# Patient Record
Sex: FEMALE | Race: WHITE | NOT HISPANIC OR LATINO
[De-identification: names, ages, dates, MRNs, and addresses within clinical notes are randomized per-mention and may not be internally consistent; named-entity substitution may affect disease eponyms.]

---

## 2017-12-05 ENCOUNTER — APPOINTMENT (OUTPATIENT)
Dept: FAMILY MEDICINE | Facility: CLINIC | Age: 62
End: 2017-12-05
Payer: COMMERCIAL

## 2017-12-05 ENCOUNTER — NON-APPOINTMENT (OUTPATIENT)
Age: 62
End: 2017-12-05

## 2017-12-05 VITALS
BODY MASS INDEX: 23.19 KG/M2 | SYSTOLIC BLOOD PRESSURE: 130 MMHG | DIASTOLIC BLOOD PRESSURE: 80 MMHG | RESPIRATION RATE: 12 BRPM | HEART RATE: 65 BPM | WEIGHT: 126 LBS | HEIGHT: 62 IN | OXYGEN SATURATION: 98 %

## 2017-12-05 DIAGNOSIS — Z82.5 FAMILY HISTORY OF ASTHMA AND OTHER CHRONIC LOWER RESPIRATORY DISEASES: ICD-10-CM

## 2017-12-05 DIAGNOSIS — Z78.9 OTHER SPECIFIED HEALTH STATUS: ICD-10-CM

## 2017-12-05 DIAGNOSIS — Z87.39 PERSONAL HISTORY OF OTHER DISEASES OF THE MUSCULOSKELETAL SYSTEM AND CONNECTIVE TISSUE: ICD-10-CM

## 2017-12-05 DIAGNOSIS — R07.9 CHEST PAIN, UNSPECIFIED: ICD-10-CM

## 2017-12-05 DIAGNOSIS — Z87.898 PERSONAL HISTORY OF OTHER SPECIFIED CONDITIONS: ICD-10-CM

## 2017-12-05 DIAGNOSIS — Z82.49 FAMILY HISTORY OF ISCHEMIC HEART DISEASE AND OTHER DISEASES OF THE CIRCULATORY SYSTEM: ICD-10-CM

## 2017-12-05 PROCEDURE — 93000 ELECTROCARDIOGRAM COMPLETE: CPT

## 2017-12-05 PROCEDURE — 99213 OFFICE O/P EST LOW 20 MIN: CPT | Mod: 25

## 2018-02-13 ENCOUNTER — APPOINTMENT (OUTPATIENT)
Dept: FAMILY MEDICINE | Facility: CLINIC | Age: 63
End: 2018-02-13
Payer: COMMERCIAL

## 2018-02-13 VITALS
HEART RATE: 61 BPM | BODY MASS INDEX: 22.66 KG/M2 | TEMPERATURE: 98 F | OXYGEN SATURATION: 98 % | DIASTOLIC BLOOD PRESSURE: 76 MMHG | HEIGHT: 61 IN | SYSTOLIC BLOOD PRESSURE: 108 MMHG | RESPIRATION RATE: 14 BRPM | WEIGHT: 120 LBS

## 2018-02-13 LAB
BILIRUB UR QL STRIP: NORMAL
CLARITY UR: CLEAR
COLLECTION METHOD: NORMAL
GLUCOSE UR-MCNC: NORMAL
HCG UR QL: 0.2 EU/DL
HGB UR QL STRIP.AUTO: NORMAL
KETONES UR-MCNC: NORMAL
LEUKOCYTE ESTERASE UR QL STRIP: NORMAL
NITRITE UR QL STRIP: NORMAL
PH UR STRIP: 6
PROT UR STRIP-MCNC: NORMAL
SP GR UR STRIP: 1.01

## 2018-02-13 PROCEDURE — 99396 PREV VISIT EST AGE 40-64: CPT | Mod: 25

## 2018-02-13 PROCEDURE — 81003 URINALYSIS AUTO W/O SCOPE: CPT | Mod: QW

## 2019-01-14 ENCOUNTER — APPOINTMENT (OUTPATIENT)
Dept: FAMILY MEDICINE | Facility: CLINIC | Age: 64
End: 2019-01-14
Payer: COMMERCIAL

## 2019-01-14 VITALS
OXYGEN SATURATION: 100 % | BODY MASS INDEX: 20.77 KG/M2 | RESPIRATION RATE: 14 BRPM | WEIGHT: 110 LBS | HEIGHT: 61 IN | HEART RATE: 58 BPM | SYSTOLIC BLOOD PRESSURE: 106 MMHG | DIASTOLIC BLOOD PRESSURE: 62 MMHG

## 2019-01-14 DIAGNOSIS — J06.9 ACUTE UPPER RESPIRATORY INFECTION, UNSPECIFIED: ICD-10-CM

## 2019-01-14 PROCEDURE — 99213 OFFICE O/P EST LOW 20 MIN: CPT

## 2019-01-14 NOTE — ASSESSMENT
[FreeTextEntry1] : I reviewed her OTC treatment plan and she will continue. I discussed what a Viral infection is and that is what she has.

## 2019-01-14 NOTE — REVIEW OF SYSTEMS
[Chills] : chills [Fatigue] : fatigue [Earache] : earache [Nasal Discharge] : nasal discharge [Sore Throat] : sore throat [Postnasal Drip] : postnasal drip [Cough] : cough [Negative] : Heme/Lymph

## 2019-01-14 NOTE — PHYSICAL EXAM
[No Acute Distress] : no acute distress [Well Nourished] : well nourished [Well Developed] : well developed [Well-Appearing] : well-appearing [No JVD] : no jugular venous distention [Supple] : supple [No Lymphadenopathy] : no lymphadenopathy [No Respiratory Distress] : no respiratory distress  [Clear to Auscultation] : lungs were clear to auscultation bilaterally [Normal Rate] : normal rate  [Regular Rhythm] : with a regular rhythm [No Murmur] : no murmur heard [No Carotid Bruits] : no carotid bruits [Normal Posterior Cervical Nodes] : no posterior cervical lymphadenopathy [Normal Anterior Cervical Nodes] : no anterior cervical lymphadenopathy [Speech Grossly Normal] : speech grossly normal [Memory Grossly Normal] : memory grossly normal [Normal Affect] : the affect was normal [Alert and Oriented x3] : oriented to person, place, and time [Normal Mood] : the mood was normal [Normal Insight/Judgement] : insight and judgment were intact

## 2019-01-14 NOTE — HISTORY OF PRESENT ILLNESS
[___ Days ago] : [unfilled] days ago [FreeTextEntry8] : Pt is having head congestion, ear pain, PND, irritated throat, coughing. I started to feel better today.

## 2019-06-10 ENCOUNTER — APPOINTMENT (OUTPATIENT)
Dept: FAMILY MEDICINE | Facility: CLINIC | Age: 64
End: 2019-06-10
Payer: COMMERCIAL

## 2019-06-10 ENCOUNTER — NON-APPOINTMENT (OUTPATIENT)
Age: 64
End: 2019-06-10

## 2019-06-10 VITALS
SYSTOLIC BLOOD PRESSURE: 120 MMHG | BODY MASS INDEX: 21.14 KG/M2 | HEIGHT: 61 IN | RESPIRATION RATE: 14 BRPM | DIASTOLIC BLOOD PRESSURE: 80 MMHG | OXYGEN SATURATION: 96 % | HEART RATE: 63 BPM | WEIGHT: 112 LBS

## 2019-06-10 DIAGNOSIS — D64.9 ANEMIA, UNSPECIFIED: ICD-10-CM

## 2019-06-10 DIAGNOSIS — Z78.9 OTHER SPECIFIED HEALTH STATUS: ICD-10-CM

## 2019-06-10 DIAGNOSIS — Z12.11 ENCOUNTER FOR SCREENING FOR MALIGNANT NEOPLASM OF COLON: ICD-10-CM

## 2019-06-10 LAB
BILIRUB UR QL STRIP: NEGATIVE
CLARITY UR: CLEAR
COLLECTION METHOD: NORMAL
GLUCOSE UR-MCNC: NEGATIVE
HCG UR QL: 0.2 EU/DL
HGB UR QL STRIP.AUTO: NEGATIVE
KETONES UR-MCNC: NEGATIVE
LEUKOCYTE ESTERASE UR QL STRIP: NORMAL
NITRITE UR QL STRIP: NEGATIVE
PH UR STRIP: 6
PROT UR STRIP-MCNC: NEGATIVE
SP GR UR STRIP: <=1.005

## 2019-06-10 PROCEDURE — 93000 ELECTROCARDIOGRAM COMPLETE: CPT

## 2019-06-10 PROCEDURE — 99396 PREV VISIT EST AGE 40-64: CPT | Mod: 25

## 2019-06-10 PROCEDURE — 81003 URINALYSIS AUTO W/O SCOPE: CPT | Mod: QW

## 2019-06-10 NOTE — ASSESSMENT
[FreeTextEntry1] : UA/EKG reviewed with pt.  Diet/exercise/fall precautions reviewed.  FOB kit given.  She will be soon due for repeat colonoscopy.  Not fasting - lab will be scheduled.

## 2019-06-10 NOTE — HEALTH RISK ASSESSMENT
[Excellent] : ~his/her~  mood as  excellent [No falls in past year] : Patient reported no falls in the past year [0] : 2) Feeling down, depressed, or hopeless: Not at all (0) [HIV test declined] : HIV test declined [Hepatitis C test declined] : Hepatitis C test declined [None] : None [With Significant Other] : lives with significant other [Employed] : employed [] :  [# Of Children ___] : has [unfilled] children [Feels Safe at Home] : Feels safe at home [Fully functional (bathing, dressing, toileting, transferring, walking, feeding)] : Fully functional (bathing, dressing, toileting, transferring, walking, feeding) [Fully functional (using the telephone, shopping, preparing meals, housekeeping, doing laundry, using] : Fully functional and needs no help or supervision to perform IADLs (using the telephone, shopping, preparing meals, housekeeping, doing laundry, using transportation, managing medications and managing finances) [Reports normal functional visual acuity (ie: able to read med bottle)] : Reports normal functional visual acuity [Smoke Detector] : smoke detector [Carbon Monoxide Detector] : carbon monoxide detector [Safety elements used in home] : safety elements used in home [Seat Belt] :  uses seat belt [Sunscreen] : uses sunscreen [Patient/Caregiver unclear of wishes] : Patient/Caregiver unclear of wishes [] : No [de-identified] : social [de-identified] : exercises on a regular basis [de-identified] : gluten free, sugar free [[id number]] : 0 [Change in mental status noted] : No change in mental status noted [Language] : denies difficulty with language [Behavior] : denies difficulty with behavior [Handling Complex Tasks] : denies difficulty handling complex tasks [Reasoning] : denies difficulty with reasoning [Learning/Retaining New Information] : denies difficulty learning/retaining new information [Reports changes in vision] : Reports no changes in vision [Reports changes in hearing] : Reports no changes in hearing [Spatial Ability and Orientation] : denies difficulty with spatial ability and orientation [Guns at Home] : no guns at home [Reports changes in dental health] : Reports no changes in dental health [TB Exposure] : is not being exposed to tuberculosis [Travel to Developing Areas] : does not  travel to developing areas [BoneDensityDate] : 2017 [PapSmearDate] : 2/2019 [MammogramDate] : 2/2019 [ColonoscopyComments] : Dr Hills [ColonoscopyDate] : 2010 [AdvancecareDate] : 6/10/2019

## 2019-06-10 NOTE — PHYSICAL EXAM
[Well Developed] : well developed [Well Nourished] : well nourished [No Acute Distress] : no acute distress [Normal Sclera/Conjunctiva] : normal sclera/conjunctiva [Well-Appearing] : well-appearing [PERRL] : pupils equal round and reactive to light [EOMI] : extraocular movements intact [Normal Outer Ear/Nose] : the outer ears and nose were normal in appearance [Normal Oropharynx] : the oropharynx was normal [Supple] : supple [No JVD] : no jugular venous distention [No Respiratory Distress] : no respiratory distress  [Thyroid Normal, No Nodules] : the thyroid was normal and there were no nodules present [No Lymphadenopathy] : no lymphadenopathy [Clear to Auscultation] : lungs were clear to auscultation bilaterally [Regular Rhythm] : with a regular rhythm [Normal Rate] : normal rate  [Normal S1, S2] : normal S1 and S2 [No Murmur] : no murmur heard [No Carotid Bruits] : no carotid bruits [No Varicosities] : no varicosities [No Abdominal Bruit] : a ~M bruit was not heard ~T in the abdomen [Pedal Pulses Present] : the pedal pulses are present [No Edema] : there was no peripheral edema [No Palpable Aorta] : no palpable aorta [No Extremity Clubbing/Cyanosis] : no extremity clubbing/cyanosis [Soft] : abdomen soft [Non Tender] : non-tender [Non-distended] : non-distended [No Masses] : no abdominal mass palpated [No HSM] : no HSM [Normal Posterior Cervical Nodes] : no posterior cervical lymphadenopathy [Normal Bowel Sounds] : normal bowel sounds [Normal Anterior Cervical Nodes] : no anterior cervical lymphadenopathy [No CVA Tenderness] : no CVA  tenderness [No Spinal Tenderness] : no spinal tenderness [No Joint Swelling] : no joint swelling [Grossly Normal Strength/Tone] : grossly normal strength/tone [No Rash] : no rash [Normal Gait] : normal gait [Coordination Grossly Intact] : coordination grossly intact [No Focal Deficits] : no focal deficits [Deep Tendon Reflexes (DTR)] : deep tendon reflexes were 2+ and symmetric [Speech Grossly Normal] : speech grossly normal [Memory Grossly Normal] : memory grossly normal [Alert and Oriented x3] : oriented to person, place, and time [Normal Mood] : the mood was normal [Normal Insight/Judgement] : insight and judgment were intact [de-identified] : flat affect

## 2019-06-19 LAB — HEMOCCULT STL QL IA: NEGATIVE

## 2019-07-09 LAB
CHOLEST SERPL-MCNC: 244
ESTIMATED AVERAGE GLUCOSE: 103
HBA1C MFR BLD HPLC: 5.2
HDLC SERPL-MCNC: 113
LDLC SERPL CALC-MCNC: 121
TRIGL SERPL-MCNC: 48

## 2019-11-11 ENCOUNTER — APPOINTMENT (OUTPATIENT)
Dept: FAMILY MEDICINE | Facility: CLINIC | Age: 64
End: 2019-11-11
Payer: COMMERCIAL

## 2019-11-11 VITALS
SYSTOLIC BLOOD PRESSURE: 130 MMHG | HEART RATE: 70 BPM | BODY MASS INDEX: 21.14 KG/M2 | DIASTOLIC BLOOD PRESSURE: 80 MMHG | OXYGEN SATURATION: 98 % | WEIGHT: 112 LBS | HEIGHT: 61 IN | RESPIRATION RATE: 14 BRPM | TEMPERATURE: 97.6 F

## 2019-11-11 DIAGNOSIS — Z11.59 ENCOUNTER FOR SCREENING FOR OTHER VIRAL DISEASES: ICD-10-CM

## 2019-11-11 DIAGNOSIS — M25.50 PAIN IN UNSPECIFIED JOINT: ICD-10-CM

## 2019-11-11 DIAGNOSIS — M25.861 OTHER SPECIFIED JOINT DISORDERS, RIGHT KNEE: ICD-10-CM

## 2019-11-11 DIAGNOSIS — M25.551 PAIN IN RIGHT HIP: ICD-10-CM

## 2019-11-11 PROCEDURE — 99214 OFFICE O/P EST MOD 30 MIN: CPT

## 2019-11-11 NOTE — REVIEW OF SYSTEMS
[Joint Pain] : joint pain [Joint Stiffness] : joint stiffness [Negative] : Heme/Lymph [de-identified] : lump on back of knee

## 2019-11-11 NOTE — PHYSICAL EXAM
[Normal] : affect was normal and insight and judgment were intact [de-identified] : R knee: golf ball sized lump noted to posterior medial knee. Lump is soft, mobile and nontender. No skin changes or erythema. Full ROM of knee, no trauma or defmormity. R hip: no gross trauma or deformity. pain noted with flexion and active rom. no swelling.

## 2019-11-11 NOTE — HISTORY OF PRESENT ILLNESS
[FreeTextEntry8] : patient c/o R hip pain x several months. Pt states she is ok walking but has pain when she is sitting and sleeping. Rates pain as moderate, aching/sharp in quality. Pain and stiffness is worse in the morning. Pt also c/o a lump on the back of her knee, states she has had it for a few months but recently noticed it got bigger in size. States it is nontender, denies any calf pain, numbness or tingling, weakness to extremity. She is concerned about a possible tick disease due to worsening joint pains.

## 2019-11-13 LAB
B BURGDOR AB SER-IMP: NEGATIVE
B BURGDOR IGG+IGM SER QL: 0.06 INDEX
BASOPHILS # BLD AUTO: 0.05 K/UL
BASOPHILS NFR BLD AUTO: 1 %
CRP SERPL-MCNC: <0.1 MG/DL
EOSINOPHIL # BLD AUTO: 0.16 K/UL
EOSINOPHIL NFR BLD AUTO: 3.2 %
ERYTHROCYTE [SEDIMENTATION RATE] IN BLOOD BY WESTERGREN METHOD: 9 MM/HR
HCT VFR BLD CALC: 36.1 %
HGB BLD-MCNC: 11.5 G/DL
IMM GRANULOCYTES NFR BLD AUTO: 0.2 %
LYMPHOCYTES # BLD AUTO: 1.6 K/UL
LYMPHOCYTES NFR BLD AUTO: 32.5 %
MAN DIFF?: NORMAL
MCHC RBC-ENTMCNC: 31 PG
MCHC RBC-ENTMCNC: 31.9 GM/DL
MCV RBC AUTO: 97.3 FL
MONOCYTES # BLD AUTO: 0.4 K/UL
MONOCYTES NFR BLD AUTO: 8.1 %
NEUTROPHILS # BLD AUTO: 2.71 K/UL
NEUTROPHILS NFR BLD AUTO: 55 %
PLATELET # BLD AUTO: 237 K/UL
RBC # BLD: 3.71 M/UL
RBC # FLD: 13.2 %
RHEUMATOID FACT SER QL: <10 IU/ML
URATE SERPL-MCNC: 6.3 MG/DL
WBC # FLD AUTO: 4.93 K/UL

## 2019-11-14 LAB
ANA SER IF-ACNC: NEGATIVE
B BURGDOR AB SER-IMP: NEGATIVE
B BURGDOR IGM PATRN SER IB-IMP: NEGATIVE
B BURGDOR18KD IGG SER QL IB: PRESENT
B BURGDOR23KD IGG SER QL IB: NORMAL
B BURGDOR23KD IGM SER QL IB: NORMAL
B BURGDOR28KD IGG SER QL IB: NORMAL
B BURGDOR30KD IGG SER QL IB: NORMAL
B BURGDOR31KD IGG SER QL IB: NORMAL
B BURGDOR39KD IGG SER QL IB: NORMAL
B BURGDOR39KD IGM SER QL IB: NORMAL
B BURGDOR41KD IGG SER QL IB: PRESENT
B BURGDOR41KD IGM SER QL IB: NORMAL
B BURGDOR45KD IGG SER QL IB: NORMAL
B BURGDOR58KD IGG SER QL IB: NORMAL
B BURGDOR66KD IGG SER QL IB: NORMAL
B BURGDOR93KD IGG SER QL IB: NORMAL
B MICROTI AB SER QL: NORMAL
BABESIA ANTIBODIES, IGG: NORMAL
BABESIA ANTIBODIES, IGM: NORMAL

## 2019-11-15 LAB
ANAPLASMA PHAGOCYTO IGM COMENT: NORMAL
ANAPLASMA PHAGOCYTO IGM COMMENT: NORMAL
ANAPLASMA PHAGOCYTOPHILIA IGG ANTIBODIES: NORMAL
ANAPLASMA PHAGOCYTOPHILIA IGM ANTIBODIES: NORMAL
EHRLICIA CHAFFEENIS IGG ANTIBODIES: NORMAL
EHRLICIA CHAFFEENIS IGG COMMENT: NORMAL
EHRLICIA CHAFFEENIS IGG INTERP: NORMAL
EHRLICIA CHAFFEENIS IGM ANTIBODIES: NORMAL
R RICKETTSI IGG CSF-ACNC: NEGATIVE
R RICKETTSI IGM CSF-ACNC: 0.34 INDEX

## 2019-11-22 LAB — F TULAR AB SER-ACNC: NORMAL

## 2020-06-16 ENCOUNTER — APPOINTMENT (OUTPATIENT)
Dept: FAMILY MEDICINE | Facility: CLINIC | Age: 65
End: 2020-06-16
Payer: COMMERCIAL

## 2020-06-16 ENCOUNTER — NON-APPOINTMENT (OUTPATIENT)
Age: 65
End: 2020-06-16

## 2020-06-16 VITALS
WEIGHT: 117 LBS | OXYGEN SATURATION: 98 % | HEIGHT: 61 IN | RESPIRATION RATE: 14 BRPM | HEART RATE: 64 BPM | BODY MASS INDEX: 22.09 KG/M2 | SYSTOLIC BLOOD PRESSURE: 108 MMHG | DIASTOLIC BLOOD PRESSURE: 80 MMHG

## 2020-06-16 DIAGNOSIS — Z11.59 ENCOUNTER FOR SCREENING FOR OTHER VIRAL DISEASES: ICD-10-CM

## 2020-06-16 DIAGNOSIS — Z13.228 ENCOUNTER FOR SCREENING FOR OTHER METABOLIC DISORDERS: ICD-10-CM

## 2020-06-16 DIAGNOSIS — Z13.1 ENCOUNTER FOR SCREENING FOR DIABETES MELLITUS: ICD-10-CM

## 2020-06-16 DIAGNOSIS — Z13.220 ENCOUNTER FOR SCREENING FOR LIPOID DISORDERS: ICD-10-CM

## 2020-06-16 DIAGNOSIS — Z13.29 ENCOUNTER FOR SCREENING FOR OTHER SUSPECTED ENDOCRINE DISORDER: ICD-10-CM

## 2020-06-16 DIAGNOSIS — Z13.0 ENCOUNTER FOR SCREENING FOR DISEASES OF THE BLOOD AND BLOOD-FORMING ORGANS AND CERTAIN DISORDERS INVOLVING THE IMMUNE MECHANISM: ICD-10-CM

## 2020-06-16 LAB
BILIRUB UR QL STRIP: NEGATIVE
CLARITY UR: CLEAR
COLLECTION METHOD: NORMAL
CYTOLOGY CVX/VAG DOC THIN PREP: NORMAL
GLUCOSE UR-MCNC: NEGATIVE
HCG UR QL: 0.2 EU/DL
HGB UR QL STRIP.AUTO: NEGATIVE
KETONES UR-MCNC: NEGATIVE
LEUKOCYTE ESTERASE UR QL STRIP: NEGATIVE
NITRITE UR QL STRIP: NEGATIVE
PH UR STRIP: 7.5
PROT UR STRIP-MCNC: NEGATIVE
SP GR UR STRIP: 1.01

## 2020-06-16 PROCEDURE — 36415 COLL VENOUS BLD VENIPUNCTURE: CPT

## 2020-06-16 PROCEDURE — 93000 ELECTROCARDIOGRAM COMPLETE: CPT

## 2020-06-16 PROCEDURE — 99396 PREV VISIT EST AGE 40-64: CPT | Mod: 25

## 2020-06-16 PROCEDURE — 81003 URINALYSIS AUTO W/O SCOPE: CPT | Mod: QW

## 2020-06-16 NOTE — ASSESSMENT
[FreeTextEntry1] : UA/ EKG reviewed.  Diet/ exercise/  COVID19 precautions reviewed.  Lab done here today.

## 2020-06-16 NOTE — HISTORY OF PRESENT ILLNESS
[FreeTextEntry1] : Patient present for physical\par  [de-identified] : Recent evaluation of back and right shoulder pain. Seeing a DC and doing PT

## 2020-06-16 NOTE — HEALTH RISK ASSESSMENT
[Very Good] : ~his/her~ current health as very good [Yes] : Yes [4 or more  times a week (4 pts)] : 4 or more  times a week (4 points) [1 or 2 (0 pts)] : 1 or 2 (0 points) [Never (0 pts)] : Never (0 points) [No] : In the past 12 months have you used drugs other than those required for medical reasons? No [No falls in past year] : Patient reported no falls in the past year [0] : 2) Feeling down, depressed, or hopeless: Not at all (0) [Patient reported colonoscopy was normal] : Patient reported colonoscopy was normal [HIV test declined] : HIV test declined [Hepatitis C test declined] : Hepatitis C test declined [None] : None [Employed] : employed [With Significant Other] : lives with significant other [College] : College [] :  [# Of Children ___] : has [unfilled] children [Feels Safe at Home] : Feels safe at home [Fully functional (bathing, dressing, toileting, transferring, walking, feeding)] : Fully functional (bathing, dressing, toileting, transferring, walking, feeding) [Fully functional (using the telephone, shopping, preparing meals, housekeeping, doing laundry, using] : Fully functional and needs no help or supervision to perform IADLs (using the telephone, shopping, preparing meals, housekeeping, doing laundry, using transportation, managing medications and managing finances) [Reports normal functional visual acuity (ie: able to read med bottle)] : Reports normal functional visual acuity [Smoke Detector] : smoke detector [Carbon Monoxide Detector] : carbon monoxide detector [Safety elements used in home] : safety elements used in home [Seat Belt] :  uses seat belt [Sunscreen] : uses sunscreen [Patient/Caregiver unclear of wishes] : Patient/Caregiver unclear of wishes [] : No [Audit-CScore] : 4 [de-identified] : regular exercise [de-identified] : healthy diet [[id number]] : 0 [Change in mental status noted] : No change in mental status noted [Language] : denies difficulty with language [Behavior] : denies difficulty with behavior [Learning/Retaining New Information] : denies difficulty learning/retaining new information [Handling Complex Tasks] : denies difficulty handling complex tasks [Reasoning] : denies difficulty with reasoning [Spatial Ability and Orientation] : denies difficulty with spatial ability and orientation [Reports changes in hearing] : Reports no changes in hearing [Reports changes in vision] : Reports no changes in vision [Reports changes in dental health] : Reports no changes in dental health [Guns at Home] : no guns at home [Travel to Developing Areas] : does not  travel to developing areas [TB Exposure] : is not being exposed to tuberculosis [MammogramDate] : 11/19 [PapSmearDate] : 2019 [BoneDensityDate] : 11/19 [ColonoscopyDate] : 2/2020 [ColonoscopyComments] : 5 years  [AdvancecareDate] : 6/16/2020

## 2020-06-16 NOTE — PHYSICAL EXAM
[No Acute Distress] : no acute distress [Well Nourished] : well nourished [Well Developed] : well developed [Well-Appearing] : well-appearing [Normal Sclera/Conjunctiva] : normal sclera/conjunctiva [PERRL] : pupils equal round and reactive to light [EOMI] : extraocular movements intact [Normal Outer Ear/Nose] : the outer ears and nose were normal in appearance [Normal Oropharynx] : the oropharynx was normal [No JVD] : no jugular venous distention [No Lymphadenopathy] : no lymphadenopathy [Supple] : supple [Thyroid Normal, No Nodules] : the thyroid was normal and there were no nodules present [No Respiratory Distress] : no respiratory distress  [No Accessory Muscle Use] : no accessory muscle use [Clear to Auscultation] : lungs were clear to auscultation bilaterally [Normal Rate] : normal rate  [Regular Rhythm] : with a regular rhythm [Normal S1, S2] : normal S1 and S2 [No Murmur] : no murmur heard [No Carotid Bruits] : no carotid bruits [No Varicosities] : no varicosities [No Abdominal Bruit] : a ~M bruit was not heard ~T in the abdomen [No Edema] : there was no peripheral edema [Pedal Pulses Present] : the pedal pulses are present [No Palpable Aorta] : no palpable aorta [No Extremity Clubbing/Cyanosis] : no extremity clubbing/cyanosis [Soft] : abdomen soft [Non Tender] : non-tender [Non-distended] : non-distended [No Masses] : no abdominal mass palpated [No HSM] : no HSM [Normal Bowel Sounds] : normal bowel sounds [Normal Posterior Cervical Nodes] : no posterior cervical lymphadenopathy [Normal Anterior Cervical Nodes] : no anterior cervical lymphadenopathy [No CVA Tenderness] : no CVA  tenderness [No Spinal Tenderness] : no spinal tenderness [No Joint Swelling] : no joint swelling [Grossly Normal Strength/Tone] : grossly normal strength/tone [No Rash] : no rash [Coordination Grossly Intact] : coordination grossly intact [No Focal Deficits] : no focal deficits [Normal Gait] : normal gait [Speech Grossly Normal] : speech grossly normal [Deep Tendon Reflexes (DTR)] : deep tendon reflexes were 2+ and symmetric [Normal Affect] : the affect was normal [Memory Grossly Normal] : memory grossly normal [Alert and Oriented x3] : oriented to person, place, and time [Normal Mood] : the mood was normal [Normal Insight/Judgement] : insight and judgment were intact [de-identified] : very tan

## 2020-06-18 LAB
ABO + RH PNL BLD: NORMAL
ALBUMIN SERPL ELPH-MCNC: 4.7 G/DL
ALP BLD-CCNC: 42 U/L
ALT SERPL-CCNC: 19 U/L
ANION GAP SERPL CALC-SCNC: 12 MMOL/L
AST SERPL-CCNC: 29 U/L
BASOPHILS # BLD AUTO: 0.04 K/UL
BASOPHILS NFR BLD AUTO: 1.2 %
BILIRUB SERPL-MCNC: 0.5 MG/DL
BUN SERPL-MCNC: 18 MG/DL
CALCIUM SERPL-MCNC: 9.6 MG/DL
CHLORIDE SERPL-SCNC: 103 MMOL/L
CHOLEST SERPL-MCNC: 247 MG/DL
CHOLEST/HDLC SERPL: 2.1 RATIO
CO2 SERPL-SCNC: 26 MMOL/L
CREAT SERPL-MCNC: 0.74 MG/DL
EOSINOPHIL # BLD AUTO: 0.09 K/UL
EOSINOPHIL NFR BLD AUTO: 2.6 %
ESTIMATED AVERAGE GLUCOSE: 100 MG/DL
GLUCOSE SERPL-MCNC: 104 MG/DL
HBA1C MFR BLD HPLC: 5.1 %
HCT VFR BLD CALC: 39.3 %
HDLC SERPL-MCNC: 117 MG/DL
HGB BLD-MCNC: 12.5 G/DL
IMM GRANULOCYTES NFR BLD AUTO: 0 %
LDLC SERPL CALC-MCNC: 118 MG/DL
LYMPHOCYTES # BLD AUTO: 1.32 K/UL
LYMPHOCYTES NFR BLD AUTO: 38.2 %
MAN DIFF?: NORMAL
MCHC RBC-ENTMCNC: 31.3 PG
MCHC RBC-ENTMCNC: 31.8 GM/DL
MCV RBC AUTO: 98.3 FL
MONOCYTES # BLD AUTO: 0.28 K/UL
MONOCYTES NFR BLD AUTO: 8.1 %
NEUTROPHILS # BLD AUTO: 1.73 K/UL
NEUTROPHILS NFR BLD AUTO: 49.9 %
PLATELET # BLD AUTO: 246 K/UL
POTASSIUM SERPL-SCNC: 4.4 MMOL/L
PROT SERPL-MCNC: 6.6 G/DL
RBC # BLD: 4 M/UL
RBC # FLD: 13.6 %
SARS-COV-2 IGG SERPL IA-ACNC: <0.1 INDEX
SARS-COV-2 IGG SERPL QL IA: NEGATIVE
SODIUM SERPL-SCNC: 142 MMOL/L
T3FREE SERPL-MCNC: 2.57 PG/ML
T4 FREE SERPL-MCNC: 1 NG/DL
TRIGL SERPL-MCNC: 60 MG/DL
TSH SERPL-ACNC: 2.26 UIU/ML
WBC # FLD AUTO: 3.46 K/UL

## 2020-07-18 ENCOUNTER — TRANSCRIPTION ENCOUNTER (OUTPATIENT)
Age: 65
End: 2020-07-18

## 2020-12-21 PROBLEM — J06.9 URI WITH COUGH AND CONGESTION: Status: RESOLVED | Noted: 2019-01-14 | Resolved: 2020-12-21

## 2021-03-29 ENCOUNTER — NON-APPOINTMENT (OUTPATIENT)
Age: 66
End: 2021-03-29

## 2021-04-19 ENCOUNTER — APPOINTMENT (OUTPATIENT)
Dept: FAMILY MEDICINE | Facility: CLINIC | Age: 66
End: 2021-04-19
Payer: MEDICARE

## 2021-04-19 ENCOUNTER — NON-APPOINTMENT (OUTPATIENT)
Age: 66
End: 2021-04-19

## 2021-04-19 VITALS
RESPIRATION RATE: 14 BRPM | OXYGEN SATURATION: 98 % | BODY MASS INDEX: 21.71 KG/M2 | HEART RATE: 80 BPM | HEIGHT: 61 IN | SYSTOLIC BLOOD PRESSURE: 120 MMHG | DIASTOLIC BLOOD PRESSURE: 70 MMHG | WEIGHT: 115 LBS | TEMPERATURE: 97.3 F

## 2021-04-19 DIAGNOSIS — R00.2 PALPITATIONS: ICD-10-CM

## 2021-04-19 PROCEDURE — G0402 INITIAL PREVENTIVE EXAM: CPT

## 2021-04-19 PROCEDURE — G0403: CPT

## 2021-04-19 PROCEDURE — 36415 COLL VENOUS BLD VENIPUNCTURE: CPT

## 2021-04-19 PROCEDURE — 99072 ADDL SUPL MATRL&STAF TM PHE: CPT

## 2021-04-19 RX ORDER — ZINC SULFATE 50(220)MG
CAPSULE ORAL
Refills: 0 | Status: ACTIVE | COMMUNITY

## 2021-04-19 RX ORDER — UBIDECARENONE 30 MG
CAPSULE ORAL
Refills: 0 | Status: ACTIVE | COMMUNITY

## 2021-04-19 NOTE — HISTORY OF PRESENT ILLNESS
[FreeTextEntry1] : physical examination  [de-identified] : 66 yr old female presented for physical examination. Reports she is not taking any prescribed mediations at this time. Denies changes in vision, dizziness, chest pain, palpitations and lower extremity edema.\par

## 2021-04-19 NOTE — HEALTH RISK ASSESSMENT
[Excellent] : ~his/her~  mood as  excellent [Intercurrent Urgi Care visits] : went to urgent care [No] : In the past 12 months have you used drugs other than those required for medical reasons? No [No falls in past year] : Patient reported no falls in the past year [Patient reported mammogram was normal] : Patient reported mammogram was normal [Patient reported PAP Smear was normal] : Patient reported PAP Smear was normal [Patient reported bone density results were normal] : Patient reported bone density results were normal [Patient reported colonoscopy was normal] : Patient reported colonoscopy was normal [HIV test declined] : HIV test declined [Hepatitis C test declined] : Hepatitis C test declined [None] : None [With Significant Other] : lives with significant other [With Family] : lives with family [Employed] : employed [] :  [# Of Children ___] : has [unfilled] children [Sexually Active] : sexually active [Feels Safe at Home] : Feels safe at home [Fully functional (bathing, dressing, toileting, transferring, walking, feeding)] : Fully functional (bathing, dressing, toileting, transferring, walking, feeding) [Fully functional (using the telephone, shopping, preparing meals, housekeeping, doing laundry, using] : Fully functional and needs no help or supervision to perform IADLs (using the telephone, shopping, preparing meals, housekeeping, doing laundry, using transportation, managing medications and managing finances) [Smoke Detector] : smoke detector [Carbon Monoxide Detector] : carbon monoxide detector [Seat Belt] :  uses seat belt [Sunscreen] : uses sunscreen [Patient/Caregiver not ready to engage] : Patient/Caregiver not ready to engage [Designated Healthcare Proxy] : Designated healthcare proxy [Name: ___] : Health Care Proxy's Name: [unfilled]  [Relationship: ___] : Relationship: [unfilled] [FreeTextEntry1] : chronic back pain  [] : No [de-identified] : COVID testing  [de-identified] : Dr. Zhou (spine surgeon), Dr. Proctor (GI), Dr. Vallejo (GYN)  [de-identified] : refer SBIRT  [de-identified] : walk daily, elliptical, weights  [Change in mental status noted] : No change in mental status noted [Reports changes in hearing] : Reports no changes in hearing [Reports changes in vision] : Reports no changes in vision [Reports changes in dental health] : Reports no changes in dental health [Guns at Home] : no guns at home [MammogramDate] : 2/2020 [PapSmearDate] : 4/21 [BoneDensityDate] : 2/2020 [ColonoscopyDate] : 2/2020 [FreeTextEntry2] :   [AdvancecareDate] : 4/19/2021

## 2021-04-19 NOTE — CURRENT MEDS
[None] : Patient does not have any barriers to medication adherence [Takes medication as prescribed] : takes [Yes] : Reviewed medication list for presence of high-risk medications.

## 2021-04-19 NOTE — PLAN
[FreeTextEntry1] : 66 yr old  female CPE \par \par Normal physical examination and vital signs \par In office ECG was normal \par \par Routine lab work today to screen for anemia, CAD, liver and kidney abnormalities, and thyroid disorders (CBC, CMP, lipid, TSH and A1c) \par Continue OTC vitamin supplementation as directed \par \par RTO as routine for follow up. \par \par \par \par \par

## 2021-04-19 NOTE — PHYSICAL EXAM
[No Acute Distress] : no acute distress [Well Nourished] : well nourished [Well Developed] : well developed [Well-Appearing] : well-appearing [Normal Sclera/Conjunctiva] : normal sclera/conjunctiva [Normal Outer Ear/Nose] : the outer ears and nose were normal in appearance [Normal Oropharynx] : the oropharynx was normal [Normal TMs] : both tympanic membranes were normal [No JVD] : no jugular venous distention [No Lymphadenopathy] : no lymphadenopathy [Supple] : supple [No Respiratory Distress] : no respiratory distress  [No Accessory Muscle Use] : no accessory muscle use [Clear to Auscultation] : lungs were clear to auscultation bilaterally [Normal Rate] : normal rate  [Regular Rhythm] : with a regular rhythm [Normal S1, S2] : normal S1 and S2 [No Murmur] : no murmur heard [No Carotid Bruits] : no carotid bruits [No Edema] : there was no peripheral edema [No Extremity Clubbing/Cyanosis] : no extremity clubbing/cyanosis [Normal Posterior Cervical Nodes] : no posterior cervical lymphadenopathy [Normal Anterior Cervical Nodes] : no anterior cervical lymphadenopathy [Kyphosis] : no kyphosis [Scoliosis] : no scoliosis [Normal Gait] : normal gait [Normal Affect] : the affect was normal [Alert and Oriented x3] : oriented to person, place, and time [Normal Insight/Judgement] : insight and judgment were intact

## 2021-04-19 NOTE — REASON FOR VISIT
[IPP (Welcome to Medicare)] : an IPP (Welcome to Medicare) visit [FreeTextEntry1] : Patient presents for Medicare Wellness Exam

## 2021-04-21 LAB
ALBUMIN SERPL ELPH-MCNC: 4.4 G/DL
ALP BLD-CCNC: 54 U/L
ALT SERPL-CCNC: 20 U/L
ANION GAP SERPL CALC-SCNC: 9 MMOL/L
AST SERPL-CCNC: 29 U/L
BASOPHILS # BLD AUTO: 0.04 K/UL
BASOPHILS NFR BLD AUTO: 0.9 %
BILIRUB SERPL-MCNC: 0.4 MG/DL
BUN SERPL-MCNC: 23 MG/DL
CALCIUM SERPL-MCNC: 9.7 MG/DL
CHLORIDE SERPL-SCNC: 102 MMOL/L
CHOLEST SERPL-MCNC: 234 MG/DL
CO2 SERPL-SCNC: 28 MMOL/L
COVID-19 SPIKE DOMAIN ANTIBODY INTERPRETATION: NEGATIVE
CREAT SERPL-MCNC: 0.69 MG/DL
EOSINOPHIL # BLD AUTO: 0.13 K/UL
EOSINOPHIL NFR BLD AUTO: 3 %
ESTIMATED AVERAGE GLUCOSE: 103 MG/DL
GLUCOSE SERPL-MCNC: 101 MG/DL
HBA1C MFR BLD HPLC: 5.2 %
HCT VFR BLD CALC: 37.2 %
HDLC SERPL-MCNC: 95 MG/DL
HGB BLD-MCNC: 12 G/DL
IMM GRANULOCYTES NFR BLD AUTO: 0.2 %
LDLC SERPL CALC-MCNC: 126 MG/DL
LYMPHOCYTES # BLD AUTO: 1.21 K/UL
LYMPHOCYTES NFR BLD AUTO: 27.9 %
MAGNESIUM SERPL-MCNC: 2 MG/DL
MAN DIFF?: NORMAL
MCHC RBC-ENTMCNC: 31 PG
MCHC RBC-ENTMCNC: 32.3 GM/DL
MCV RBC AUTO: 96.1 FL
MONOCYTES # BLD AUTO: 0.33 K/UL
MONOCYTES NFR BLD AUTO: 7.6 %
NEUTROPHILS # BLD AUTO: 2.62 K/UL
NEUTROPHILS NFR BLD AUTO: 60.4 %
NONHDLC SERPL-MCNC: 138 MG/DL
PLATELET # BLD AUTO: 262 K/UL
POTASSIUM SERPL-SCNC: 4.7 MMOL/L
PROT SERPL-MCNC: 6.4 G/DL
RBC # BLD: 3.87 M/UL
RBC # FLD: 13.5 %
SARS-COV-2 AB SERPL IA-ACNC: 0.4 U/ML
SODIUM SERPL-SCNC: 139 MMOL/L
T3FREE SERPL-MCNC: 2.52 PG/ML
T4 FREE SERPL-MCNC: 0.9 NG/DL
TRIGL SERPL-MCNC: 61 MG/DL
TSH SERPL-ACNC: 2.46 UIU/ML
WBC # FLD AUTO: 4.34 K/UL

## 2021-04-28 ENCOUNTER — NON-APPOINTMENT (OUTPATIENT)
Age: 66
End: 2021-04-28

## 2022-01-21 ENCOUNTER — APPOINTMENT (OUTPATIENT)
Dept: FAMILY MEDICINE | Facility: CLINIC | Age: 67
End: 2022-01-21
Payer: MEDICARE

## 2022-01-21 DIAGNOSIS — J02.9 ACUTE PHARYNGITIS, UNSPECIFIED: ICD-10-CM

## 2022-01-21 DIAGNOSIS — U07.1 COVID-19: ICD-10-CM

## 2022-01-21 DIAGNOSIS — R51.9 HEADACHE, UNSPECIFIED: ICD-10-CM

## 2022-01-21 PROCEDURE — 99443: CPT

## 2022-01-21 NOTE — REVIEW OF SYSTEMS
[Earache] : earache [Sore Throat] : sore throat [Negative] : Heme/Lymph [Fever] : no fever [Chills] : chills [Fatigue] : fatigue [Hearing Loss] : no hearing loss [Nosebleed] : no nosebleeds [Hoarseness] : no hoarseness [Nasal Discharge] : no nasal discharge [Postnasal Drip] : no postnasal drip

## 2022-01-21 NOTE — PLAN
[FreeTextEntry1] : 66 yr old unvaccinated female with COVID \par sent for PCR test to apply for monoclonal antibodies \par pt instructed to stay quarantined 10 days after + test \par may take vitamin C and zinc \par may take Tylenol PRN fever > 100.4 \par advised to stay well hydrated \par report to ER if symptoms worsened such  as SOB and chest pain.\par

## 2022-01-21 NOTE — HISTORY OF PRESENT ILLNESS
[Home] : at home, [unfilled] , at the time of the visit. [Medical Office: ([facility])___] : at the medical office located in  [Verbal consent obtained from patient] : the patient, [unfilled] [FreeTextEntry8] : Verbal consent was obtained from patient for telephonic visit on 1/21/2022\par per : wife is not feeling well. They just returned from [place] \par \par Reports positive home COVID test on 1/19/21. Admits to sore throat and headaches. Denies fever, chills, SOB.

## 2022-01-21 NOTE — END OF VISIT
[FreeTextEntry3] : 22 minutes was spent with patient. Extensive discussion regarding medical compliance with medications, healthy lifestyle and importance of behavioral health.\par  [Time Spent: ___ minutes] : I have spent [unfilled] minutes of time on the encounter.

## 2022-01-22 ENCOUNTER — TRANSCRIPTION ENCOUNTER (OUTPATIENT)
Age: 67
End: 2022-01-22

## 2022-02-18 ENCOUNTER — APPOINTMENT (OUTPATIENT)
Dept: RHEUMATOLOGY | Facility: CLINIC | Age: 67
End: 2022-02-18
Payer: MEDICARE

## 2022-02-18 ENCOUNTER — APPOINTMENT (OUTPATIENT)
Dept: FAMILY MEDICINE | Facility: CLINIC | Age: 67
End: 2022-02-18

## 2022-02-18 VITALS
OXYGEN SATURATION: 98 % | HEIGHT: 61 IN | TEMPERATURE: 96.4 F | WEIGHT: 119 LBS | HEART RATE: 76 BPM | DIASTOLIC BLOOD PRESSURE: 70 MMHG | SYSTOLIC BLOOD PRESSURE: 110 MMHG | BODY MASS INDEX: 22.47 KG/M2

## 2022-02-18 DIAGNOSIS — G89.29 PAIN IN RIGHT SHOULDER: ICD-10-CM

## 2022-02-18 DIAGNOSIS — Z87.891 PERSONAL HISTORY OF NICOTINE DEPENDENCE: ICD-10-CM

## 2022-02-18 DIAGNOSIS — Z80.8 FAMILY HISTORY OF MALIGNANT NEOPLASM OF OTHER ORGANS OR SYSTEMS: ICD-10-CM

## 2022-02-18 DIAGNOSIS — M25.511 PAIN IN RIGHT SHOULDER: ICD-10-CM

## 2022-02-18 DIAGNOSIS — Z82.49 FAMILY HISTORY OF ISCHEMIC HEART DISEASE AND OTHER DISEASES OF THE CIRCULATORY SYSTEM: ICD-10-CM

## 2022-02-18 DIAGNOSIS — R53.83 OTHER FATIGUE: ICD-10-CM

## 2022-02-18 DIAGNOSIS — M48.062 SPINAL STENOSIS, LUMBAR REGION WITH NEUROGENIC CLAUDICATION: ICD-10-CM

## 2022-02-18 DIAGNOSIS — Z83.3 FAMILY HISTORY OF DIABETES MELLITUS: ICD-10-CM

## 2022-02-18 DIAGNOSIS — E78.5 HYPERLIPIDEMIA, UNSPECIFIED: ICD-10-CM

## 2022-02-18 DIAGNOSIS — Z80.3 FAMILY HISTORY OF MALIGNANT NEOPLASM OF BREAST: ICD-10-CM

## 2022-02-18 PROCEDURE — 99203 OFFICE O/P NEW LOW 30 MIN: CPT

## 2022-02-18 PROCEDURE — 99213 OFFICE O/P EST LOW 20 MIN: CPT

## 2022-02-19 PROBLEM — Z80.3 FAMILY HISTORY OF MALIGNANT NEOPLASM OF BREAST: Status: ACTIVE | Noted: 2022-02-18

## 2022-02-19 PROBLEM — M25.511 CHRONIC RIGHT SHOULDER PAIN: Status: ACTIVE | Noted: 2022-02-19

## 2022-02-19 PROBLEM — R53.83 OTHER FATIGUE: Status: ACTIVE | Noted: 2022-02-19

## 2022-02-19 PROBLEM — Z83.3 FAMILY HISTORY OF DIABETES MELLITUS: Status: ACTIVE | Noted: 2022-02-18

## 2022-02-19 PROBLEM — R53.83 FATIGUE: Status: ACTIVE | Noted: 2022-02-19

## 2022-02-19 PROBLEM — Z87.891 FORMER SMOKER: Status: ACTIVE | Noted: 2022-02-18

## 2022-02-19 NOTE — HISTORY OF PRESENT ILLNESS
[FreeTextEntry1] : (Initial HPI 22) \par This is a 66 year old female presenting for consultation for shoulder, back and leg pain. \par \par COVID Jovon with high fevers x 3 days w/ double ear infections, lymphadenopathy, sore throat, rash.. essentially \par \par R shoulder pain (overuse on R side):  worse at end of day after use.. , limited ROM.. noted intermittently for years.. dx bursitis.. given steroid injection w/ excellent response.. for few years.. Not sleeping 2/2 shoulder pain.. \par \par Osteopenia: . NO fractures...  on natural supplements and c/w daily wt bearing exercise routine walking and pilates, yoga,  with excellent muscle tone\par Ex-Smoker > 30 yrs ago, drinks 2 glasses of alcohol a night \par Stable wt for ys BMI 20-22  \par \par Pain R leg:  buttocks and into R calf.. worse w/ standing and walking nagging- eventually stabbing.. not associated w/ n/t/ weakness.. better w/ stretching, releasing pressure, massage, tens, rest.  Progressively worse over past 6 m.  CBD cream w/ + response.. No limited ROM to lower extr.  Known Scoliosis.. \par MRI 3/21.. suggests L4-5 central and foraminal stenosis\par Seen by chiropractor just prior to this.. \par \par Last night:  cp mid sternal and radiating to L neck/ and shoulder intermittently since covid.. \par labs, ecg and xray\par \par ROS: \par - Long time hx of Raynaud's x 40 yrs worsened with cold weather but w/out complications \par - Denies any hair loss, rash, easy bruising, dry eyes, inflammatory eye dx, dry mouth, ulcer/sores in the mouth, cough, or GI distress. \par - post-menopausal since   \par - exercise: gym, weights and yoga, eliptical. \par  1 miscarriage 1st trimester.. \par Ys ago low wbc ys ago, no recurrence \par Ovarian cyst - ruptured.. \par \par \par \par \par

## 2022-02-19 NOTE — REVIEW OF SYSTEMS
[Feeling Tired] : feeling tired [Chest Pain] : chest pain [As Noted in HPI] : as noted in HPI [Arthralgias] : arthralgias [Joint Pain] : joint pain [Joint Stiffness] : joint stiffness [Negative] : Heme/Lymph [FreeTextEntry2] : NOT sleeping well 2/2 shoulder pain  [FreeTextEntry9] : and radicular sx- neurogenic claudication [de-identified] : absolutely denies weakness

## 2022-02-19 NOTE — PHYSICAL EXAM
[General Appearance - Alert] : alert [General Appearance - In No Acute Distress] : in no acute distress [General Appearance - Well Nourished] : well nourished [General Appearance - Well Developed] : well developed [General Appearance - Well-Appearing] : healthy appearing [PERRL With Normal Accommodation] : pupils were equal in size, round, and reactive to light [Sclera] : the sclera and conjunctiva were normal [Extraocular Movements] : extraocular movements were intact [Outer Ear] : the ears and nose were normal in appearance [Oropharynx] : the oropharynx was normal [Neck Appearance] : the appearance of the neck was normal [Neck Cervical Mass (___cm)] : no neck mass was observed [Jugular Venous Distention Increased] : there was no jugular-venous distention [Thyroid Diffuse Enlargement] : the thyroid was not enlarged [Thyroid Nodule] : there were no palpable thyroid nodules [Auscultation Breath Sounds / Voice Sounds] : lungs were clear to auscultation bilaterally [Heart Sounds] : normal S1 and S2 [Heart Rate And Rhythm] : heart rate was normal and rhythm regular [Heart Sounds Gallop] : no gallops [Murmurs] : no murmurs [Heart Sounds Pericardial Friction Rub] : no pericardial rub [Full Pulse] : the pedal pulses are present [Edema] : there was no peripheral edema [Bowel Sounds] : normal bowel sounds [Abdomen Soft] : soft [Abdomen Tenderness] : non-tender [Abdomen Mass (___ Cm)] : no abdominal mass palpated [Cervical Lymph Nodes Enlarged Posterior Bilaterally] : posterior cervical [Cervical Lymph Nodes Enlarged Anterior Bilaterally] : anterior cervical [Supraclavicular Lymph Nodes Enlarged Bilaterally] : supraclavicular [Axillary Lymph Nodes Enlarged Bilaterally] : axillary [No CVA Tenderness] : no ~M costovertebral angle tenderness [No Spinal Tenderness] : no spinal tenderness [Abnormal Walk] : normal gait [Nail Clubbing] : no clubbing  or cyanosis of the fingernails [Musculoskeletal - Swelling] : no joint swelling seen [Motor Tone] : muscle strength and tone were normal [Skin Color & Pigmentation] : normal skin color and pigmentation [Skin Turgor] : normal skin turgor [] : no rash [Deep Tendon Reflexes (DTR)] : deep tendon reflexes were 2+ and symmetric [Sensation] : the sensory exam was normal to light touch and pinprick [No Focal Deficits] : no focal deficits [2+] : left 2+ [FreeTextEntry1] : 5/5 throughout  [Oriented To Time, Place, And Person] : oriented to person, place, and time [Impaired Insight] : insight and judgment were intact [Affect] : the affect was normal

## 2022-02-19 NOTE — CONSULT LETTER
[Dear  ___] : Dear  [unfilled], [Consult Letter:] : I had the pleasure of evaluating your patient, [unfilled]. [Consult Closing:] : Thank you very much for allowing me to participate in the care of this patient.  If you have any questions, please do not hesitate to contact me. [Sincerely,] : Sincerely, [FreeTextEntry2] : Mata Simmons MD  [FreeTextEntry1] : Please see below for summary of  recent rheumatology evaluation and recommendations.\par \par This is a 66 year old female w/ hx osteopenia  presenting for consultation for shoulder, back and leg pain. \par REcent COVID infection (unvaccinated)  Jovon with high fevers x 3 days w/ double ear infections, lymphadenopathy, sore throat, rash.. essentially w/ full resolution though fatigue persists\par \par 1) R shoulder pain (overuse on R side):  for many ys worse at end of day after use.. , limited ROM.. noted intermittently for years.. dx bursitis/ tendinopathy .. given steroid injection w/ excellent response several ys ago, not interested in repeat now. \par Will continue w/ topical CBD/ or try diclofenac. \par Also discussed change in ergonomics with work.. may benefit from OT for now start PT. .\par \par 2) Osteopenia: 2020. NO fractures...  on natural supplements and c/w daily wt bearing exercise routine walking and pilates, yoga,  with excellent muscle tone\par Ex-Smoker > 30 yrs ago, drinks 2 glasses of alcohol a night \par Stable wt for ys BMI 20-22  \par \par 3) Chronic back pain w/ Pain R leg/ mild Scoliosis:  buttocks and into R calf.. worse w/ standing and walking nagging- eventually stabbing.. not associated w/ n/t/ weakness.. better w/ stretching, releasing pressure- and leaning forward, c/w neurogenic claudication- imaging MRI LS + SS throughout L4-5 region most severly. \par - Start with PT now and again discussed ergonomics.. recently changed from routine heels to flats at work.. may be contributing to altered mechanics \par - continue massage, tens, stretching and core strengthening\par - continue topical analgesics PRN\par - in future may consider low dose gabapentin (but wants to avoid pharm) or KARIE... \par NOTE: \par MRI 3/21.. suggests L4-5 central and foraminal stenosis\par Seen by chiropractor prior to onset of increased discomfort in RLE.. progressively worse since.. \par \par 3) Last night:  cp mid sternal and radiating to L neck/ and shoulder intermittently since covid.. \par labs, ecg and xray\par \par 4) Raynauds: mild x many ys but otherwise little in H&P to suggest systemic process at this time. Labs not necessary, but reviewed S/S CTD and advised to call if any new arise, would reeval .  \par ROS: only + Ys ago low wbc ys ago, no recurrence \par \par \par Plan: \par - continue conservative tx with natural OTC analgesics\par \par - PT now for R shoulder, lower back and SS\par \par - fatigue should improve with time and recovery from COVID and addressing R shoulder pain\par \par - offered R shoulder SAB injection, declines for now\par \par - discussed ergonomics at work.. may benefit from OT if continues \par \par - continue wt bearing exercises and core strengthening \par \par - call if anything new arises.. \par \par  [FreeTextEntry3] : Joanna Bolanos DNP, ANP-C\par Division or Rheumatology\par [facility]\par \par

## 2022-02-19 NOTE — ASSESSMENT
[FreeTextEntry1] : This is a 66 year old female w/ hx osteopenia  presenting for consultation for shoulder, back and leg pain. \par REcent COVID infection (unvaccinated)  Jovon with high fevers x 3 days w/ double ear infections, lymphadenopathy, sore throat, rash.. essentially w/ full resolution though fatigue persists\par \par 1) R shoulder pain (overuse on R side):  for many ys worse at end of day after use.. , limited ROM.. noted intermittently for years.. dx bursitis/ tendinopathy .. given steroid injection w/ excellent response several ys ago, not interested in repeat now. \par Will continue w/ topical CBD/ or try diclofenac. \par Also discussed change in ergonomics with work.. may benefit from OT for now start PT. \par \par 2) Osteopenia: 2020. NO fractures...  on natural supplements and c/w daily wt bearing exercise routine walking and pilates, yoga,  with excellent muscle tone\par Ex-Smoker > 30 yrs ago, drinks 2 glasses of alcohol a night \par Stable wt for ys BMI 20-22  \par \par 3) Chronic back pain w/ Pain R leg/ mild Scoliosis:  buttocks and into R calf.. worse w/ standing and walking nagging- eventually stabbing.. not associated w/ n/t/ weakness.. better w/ stretching, releasing pressure- and leaning forward, c/w neurogenic claudication- imaging MRI LS + SS throughout L4-5 region most severly. \par - Start with PT now and again discussed ergonomics.. recently changed from routine heels to flats at work.. may be contributing to altered mechanics \par - continue massage, tens, stretching and core strengthening\par - continue topical analgesics PRN\par - in future may consider low dose gabapentin (but wants to avoid pharm) or KARIE... \par NOTE: \par MRI 3/21.. suggests L4-5 central and foraminal stenosis\par Seen by chiropractor prior to onset of increased discomfort in RLE.. progressively worse since.. \par \par 3) Last night:  cp mid sternal and radiating to L neck/ and shoulder intermittently since covid.. \par labs, ecg and xray\par \par 4) Raynauds: mild x many ys but otherwise little in H&P to suggest systemic process at this time. Labs not necessary, but reviewed S/S CTD and advised to call if any new arise, would reeval .  \par ROS: only + Ys ago low wbc ys ago, no recurrence \par \par \par Plan: \par - continue conservative tx with natural OTC analgesics\par \par - PT now for R shoulder, lower back and SS\par \par - fatigue should improve with time and recovery from COVID and addressing R shoulder pain\par \par - offered R shoulder SAB injection, declines for now\par \par - discussed ergonomics at work.. may benefit from OT if continues \par \par - continue wt bearing exercises and core strengthening \par \par - call if anything new arises..

## 2022-02-19 NOTE — REASON FOR VISIT
[Consultation] : a consultation visit [FreeTextEntry1] : joint pain R shoulder and RLE most consistently

## 2022-02-19 NOTE — DATA REVIEWED
[FreeTextEntry1] : 03/2021:  MRI of lumbar spine showing degenerative changes most pronounced at L4-L5 with moderate central canal stenosis, impingement of descending bilateral L5 nerve roots and foraminal narrowing impinging the exiting left L4 nerve roots. \par \par 02/17/2022: \par Cholesterol 210,  \par \par 04/21/2021 \par - Cholesterol 234, , no COVID spike Abs, nl CBC, A1c, CMP, Mag, Thyroid studies

## 2022-02-28 ENCOUNTER — APPOINTMENT (OUTPATIENT)
Dept: FAMILY MEDICINE | Facility: CLINIC | Age: 67
End: 2022-02-28
Payer: MEDICARE

## 2022-02-28 VITALS
SYSTOLIC BLOOD PRESSURE: 110 MMHG | HEIGHT: 61 IN | HEART RATE: 65 BPM | WEIGHT: 115 LBS | OXYGEN SATURATION: 98 % | DIASTOLIC BLOOD PRESSURE: 82 MMHG | RESPIRATION RATE: 14 BRPM | BODY MASS INDEX: 21.71 KG/M2

## 2022-02-28 DIAGNOSIS — H65.90 UNSPECIFIED NONSUPPURATIVE OTITIS MEDIA, UNSPECIFIED EAR: ICD-10-CM

## 2022-02-28 DIAGNOSIS — Z23 ENCOUNTER FOR IMMUNIZATION: ICD-10-CM

## 2022-02-28 PROCEDURE — 90715 TDAP VACCINE 7 YRS/> IM: CPT

## 2022-02-28 PROCEDURE — 90471 IMMUNIZATION ADMIN: CPT

## 2022-02-28 PROCEDURE — 99213 OFFICE O/P EST LOW 20 MIN: CPT | Mod: 25

## 2022-02-28 NOTE — PLAN
[FreeTextEntry1] : 66 yr old female with left ear effusion \par advised to take OTC  Zyrtec and or Claritin \par given Tdap today,  due to new grandchild \par continue all medications as directed \par RTO as routine for follow up.\par

## 2022-02-28 NOTE — PHYSICAL EXAM
[No Acute Distress] : no acute distress [Normal Sclera/Conjunctiva] : normal sclera/conjunctiva [PERRL] : pupils equal round and reactive to light [Normal Outer Ear/Nose] : the outer ears and nose were normal in appearance [Normal Oropharynx] : the oropharynx was normal [No JVD] : no jugular venous distention [No Lymphadenopathy] : no lymphadenopathy [Supple] : supple [Thyroid Normal, No Nodules] : the thyroid was normal and there were no nodules present [No Respiratory Distress] : no respiratory distress  [No Accessory Muscle Use] : no accessory muscle use [Clear to Auscultation] : lungs were clear to auscultation bilaterally [Normal Rate] : normal rate  [Regular Rhythm] : with a regular rhythm [Normal S1, S2] : normal S1 and S2 [No Murmur] : no murmur heard [Normal Affect] : the affect was normal [de-identified] : left ear effusion

## 2022-02-28 NOTE — REVIEW OF SYSTEMS
[Earache] : earache [Nosebleed] : no nosebleeds [Hearing Loss] : no hearing loss [Hoarseness] : no hoarseness [Nasal Discharge] : no nasal discharge [Sore Throat] : no sore throat [Postnasal Drip] : no postnasal drip [Negative] : Heme/Lymph [FreeTextEntry4] : right ear pain

## 2022-02-28 NOTE — HISTORY OF PRESENT ILLNESS
[FreeTextEntry8] : Patient presents with ear pain in both ears for about 6 weeks on and off since COVID. Denies fever, chills, tinnitus and headaches.

## 2022-04-11 PROBLEM — Z11.59 SCREENING FOR VIRAL DISEASE: Status: ACTIVE | Noted: 2020-06-16

## 2022-04-15 ENCOUNTER — APPOINTMENT (OUTPATIENT)
Dept: RHEUMATOLOGY | Facility: CLINIC | Age: 67
End: 2022-04-15

## 2022-05-06 ENCOUNTER — NON-APPOINTMENT (OUTPATIENT)
Age: 67
End: 2022-05-06

## 2022-06-22 ENCOUNTER — APPOINTMENT (OUTPATIENT)
Dept: RHEUMATOLOGY | Facility: CLINIC | Age: 67
End: 2022-06-22
Payer: MEDICARE

## 2022-06-22 PROCEDURE — 99443: CPT

## 2023-04-10 ENCOUNTER — APPOINTMENT (OUTPATIENT)
Dept: FAMILY MEDICINE | Facility: CLINIC | Age: 68
End: 2023-04-10

## 2023-06-05 ENCOUNTER — APPOINTMENT (OUTPATIENT)
Dept: FAMILY MEDICINE | Facility: CLINIC | Age: 68
End: 2023-06-05
Payer: MEDICARE

## 2023-06-05 VITALS
RESPIRATION RATE: 14 BRPM | BODY MASS INDEX: 21.9 KG/M2 | WEIGHT: 116 LBS | SYSTOLIC BLOOD PRESSURE: 118 MMHG | TEMPERATURE: 98.4 F | DIASTOLIC BLOOD PRESSURE: 80 MMHG | HEIGHT: 61 IN | HEART RATE: 92 BPM | OXYGEN SATURATION: 97 %

## 2023-06-05 DIAGNOSIS — Z00.00 ENCOUNTER FOR GENERAL ADULT MEDICAL EXAMINATION W/OUT ABNORMAL FINDINGS: ICD-10-CM

## 2023-06-05 DIAGNOSIS — R92.2 INCONCLUSIVE MAMMOGRAM: ICD-10-CM

## 2023-06-05 DIAGNOSIS — Z12.31 ENCOUNTER FOR SCREENING MAMMOGRAM FOR MALIGNANT NEOPLASM OF BREAST: ICD-10-CM

## 2023-06-05 PROCEDURE — 99397 PER PM REEVAL EST PAT 65+ YR: CPT | Mod: 25

## 2023-06-05 PROCEDURE — 36415 COLL VENOUS BLD VENIPUNCTURE: CPT

## 2023-06-05 NOTE — HISTORY OF PRESENT ILLNESS
[FreeTextEntry1] : CPE  [de-identified] : 68 yr old female is here for physical examination. She has no medical complaints at this this time. Denies changes in vision, dizziness, chest pain, palpitations and lower extremity edema.\par

## 2023-06-05 NOTE — HEALTH RISK ASSESSMENT
[Excellent] : ~his/her~  mood as  excellent [1 or 2 (0 pts)] : 1 or 2 (0 points) [Never (0 pts)] : Never (0 points) [No] : In the past 12 months have you used drugs other than those required for medical reasons? No [No falls in past year] : Patient reported no falls in the past year [0] : 2) Feeling down, depressed, or hopeless: Not at all (0) [PHQ-2 Negative - No further assessment needed] : PHQ-2 Negative - No further assessment needed [HIV test declined] : HIV test declined [Hepatitis C test declined] : Hepatitis C test declined [None] : None [Employed] : employed [] :  [Feels Safe at Home] : Feels safe at home [Fully functional (bathing, dressing, toileting, transferring, walking, feeding)] : Fully functional (bathing, dressing, toileting, transferring, walking, feeding) [Fully functional (using the telephone, shopping, preparing meals, housekeeping, doing laundry, using] : Fully functional and needs no help or supervision to perform IADLs (using the telephone, shopping, preparing meals, housekeeping, doing laundry, using transportation, managing medications and managing finances) [Smoke Detector] : smoke detector [Carbon Monoxide Detector] : carbon monoxide detector [Seat Belt] :  uses seat belt [Sunscreen] : uses sunscreen [FreeTextEntry1] : none  [de-identified] : no  [de-identified] : cardio, rheum, GYN  [Audit-CScore] : 0 [de-identified] : cardio  [de-identified] : regular  [[id number]] : 0 [Patient reported mammogram was normal] : Patient reported mammogram was normal [Patient reported PAP Smear was normal] : Patient reported PAP Smear was normal [Patient reported bone density results were abnormal] : Patient reported bone density results were abnormal [Patient reported colonoscopy was normal] : Patient reported colonoscopy was normal [Change in mental status noted] : No change in mental status noted [Reports changes in hearing] : Reports no changes in hearing [Reports changes in vision] : Reports no changes in vision [Reports normal functional visual acuity (ie: able to read med bottle)] : Reports poor functional visual acuity.  [Reports changes in dental health] : Reports no changes in dental health [MammogramDate] : 2019 [BoneDensityDate] : 3/2022 [BoneDensityComments] : OP  [ColonoscopyDate] : 2020 [ColonoscopyComments] : repeat 5 yrs  [Never] : Never

## 2023-06-05 NOTE — PHYSICAL EXAM
[No Acute Distress] : no acute distress [Normal Sclera/Conjunctiva] : normal sclera/conjunctiva [Normal Outer Ear/Nose] : the outer ears and nose were normal in appearance [No JVD] : no jugular venous distention [No Lymphadenopathy] : no lymphadenopathy [Supple] : supple [No Respiratory Distress] : no respiratory distress  [No Accessory Muscle Use] : no accessory muscle use [Clear to Auscultation] : lungs were clear to auscultation bilaterally [Normal Rate] : normal rate  [Regular Rhythm] : with a regular rhythm [Normal S1, S2] : normal S1 and S2 [No Murmur] : no murmur heard [No Carotid Bruits] : no carotid bruits [Pedal Pulses Present] : the pedal pulses are present [No Extremity Clubbing/Cyanosis] : no extremity clubbing/cyanosis [Soft] : abdomen soft [Non Tender] : non-tender [Non-distended] : non-distended [No HSM] : no HSM [Normal Bowel Sounds] : normal bowel sounds [Normal Posterior Cervical Nodes] : no posterior cervical lymphadenopathy [Normal Anterior Cervical Nodes] : no anterior cervical lymphadenopathy [No Spinal Tenderness] : no spinal tenderness [Kyphosis] : no kyphosis [Normal Gait] : normal gait [Normal Affect] : the affect was normal [de-identified] : b/l corrie node

## 2023-06-05 NOTE — PLAN
[FreeTextEntry1] : 68 yr old female CPE \par takes no prescription medications at this time \par reviewed prior ECG \par pt state seen by cardio Dr. Holden with normal results \par given script for mammogram and breast US\par pt up to date for colonoscopy \par healthy diet and physical activity as tolerated\par routine labs today \par pt declined PCV and shingles vaccine today \par

## 2023-06-06 LAB
25(OH)D3 SERPL-MCNC: 43.4 NG/ML
ALBUMIN SERPL ELPH-MCNC: 4.6 G/DL
ALP BLD-CCNC: 49 U/L
ALT SERPL-CCNC: 20 U/L
ANION GAP SERPL CALC-SCNC: 9 MMOL/L
AST SERPL-CCNC: 26 U/L
BILIRUB SERPL-MCNC: 0.6 MG/DL
BUN SERPL-MCNC: 21 MG/DL
CALCIUM SERPL-MCNC: 9.7 MG/DL
CHLORIDE SERPL-SCNC: 104 MMOL/L
CHOLEST SERPL-MCNC: 263 MG/DL
CO2 SERPL-SCNC: 29 MMOL/L
CREAT SERPL-MCNC: 0.79 MG/DL
EGFR: 82 ML/MIN/1.73M2
FERRITIN SERPL-MCNC: 244 NG/ML
GLUCOSE SERPL-MCNC: 111 MG/DL
HDLC SERPL-MCNC: 104 MG/DL
LDLC SERPL CALC-MCNC: 148 MG/DL
NONHDLC SERPL-MCNC: 159 MG/DL
POTASSIUM SERPL-SCNC: 4.7 MMOL/L
PROT SERPL-MCNC: 6.8 G/DL
SODIUM SERPL-SCNC: 142 MMOL/L
T4 FREE SERPL-MCNC: 1 NG/DL
TRIGL SERPL-MCNC: 58 MG/DL
TSH SERPL-ACNC: 3.21 UIU/ML

## 2023-06-13 ENCOUNTER — APPOINTMENT (OUTPATIENT)
Dept: RHEUMATOLOGY | Facility: CLINIC | Age: 68
End: 2023-06-13
Payer: MEDICARE

## 2023-06-13 VITALS
TEMPERATURE: 98.1 F | OXYGEN SATURATION: 98 % | SYSTOLIC BLOOD PRESSURE: 126 MMHG | BODY MASS INDEX: 21.71 KG/M2 | HEIGHT: 61 IN | DIASTOLIC BLOOD PRESSURE: 70 MMHG | HEART RATE: 66 BPM | WEIGHT: 115 LBS

## 2023-06-13 PROCEDURE — 99213 OFFICE O/P EST LOW 20 MIN: CPT

## 2023-06-13 NOTE — HISTORY OF PRESENT ILLNESS
[FreeTextEntry1] : 23 \par Updated Dexa 23 w/ most severe T score -2.5 at [facility] overall completely stable from  study.  Continues on routine supplements, Vit D level 43- stable and taking Algeacal/ Strontum boost... \par Still with overall  low risk fracture 12 % overall and 2.9% risk hip fracture. Does not want to add any medications, fear of SE and doesn't believe it will help\par Routine wt bearing activity 3-5 x wk and exercises on eliptical/ walking.. \par Lengthy discussion R/B/SE treatment vs. no treatment\par Continues to focus on healthy eating working w/ nutritionist\par Labs  stable Ca/a/PTH/ Vit D 43\par \par 1) Osteoporosis\par 2) Regional pain:  R shoulder RTC tendinopathy \par 3) LBP chronic\par 4) RP mild \par _________________________________________________________________________\par \par \par \par (Initial HPI 22) \par This is a 66 year old female presenting for consultation for shoulder, back and leg pain. \par \par COVID Jovon with high fevers x 3 days w/ double ear infections, lymphadenopathy, sore throat, rash.. essentially \par \par R shoulder pain (overuse on R side):  worse at end of day after use.. , limited ROM.. noted intermittently for years.. dx bursitis.. given steroid injection w/ excellent response.. for few years.. Not sleeping 2/2 shoulder pain.. \par \par Osteopenia: . NO fractures...  on natural supplements and c/w daily wt bearing exercise routine walking and pilates, yoga,  with excellent muscle tone\par Ex-Smoker > 30 yrs ago, drinks 2 glasses of alcohol a night \par Stable wt for ys BMI 20-22  \par \par Pain R leg:  buttocks and into R calf.. worse w/ standing and walking nagging- eventually stabbing.. not associated w/ n/t/ weakness.. better w/ stretching, releasing pressure, massage, tens, rest.  Progressively worse over past 6 m.  CBD cream w/ + response.. No limited ROM to lower extr.  Known Scoliosis.. \par MRI 3/21.. suggests L4-5 central and foraminal stenosis\par Seen by chiropractor just prior to this.. \par \par Last night:  cp mid sternal and radiating to L neck/ and shoulder intermittently since covid.. \par labs, ecg and xray\par \par ROS: \par - Long time hx of Raynaud's x 40 yrs worsened with cold weather but w/out complications \par - Denies any hair loss, rash, easy bruising, dry eyes, inflammatory eye dx, dry mouth, ulcer/sores in the mouth, cough, or GI distress. \par - post-menopausal since   \par - exercise: gym, weights and yoga, eliptical. \par  1 miscarriage 1st trimester.. \par Ys ago low wbc ys ago, no recurrence \par Ovarian cyst - ruptured.. \par \par \par \par \par

## 2023-06-13 NOTE — REVIEW OF SYSTEMS
[Feeling Tired] : feeling tired [Chest Pain] : chest pain [As Noted in HPI] : as noted in HPI [Arthralgias] : arthralgias [Joint Pain] : joint pain [Joint Stiffness] : joint stiffness [Negative] : Heme/Lymph [FreeTextEntry2] : NOT sleeping well 2/2 shoulder pain  [FreeTextEntry9] : and radicular sx- neurogenic claudication [de-identified] : absolutely denies weakness

## 2023-06-13 NOTE — PHYSICAL EXAM
[General Appearance - Alert] : alert [General Appearance - In No Acute Distress] : in no acute distress [General Appearance - Well Nourished] : well nourished [General Appearance - Well Developed] : well developed [General Appearance - Well-Appearing] : healthy appearing [Auscultation Breath Sounds / Voice Sounds] : lungs were clear to auscultation bilaterally [Heart Rate And Rhythm] : heart rate was normal and rhythm regular [Heart Sounds] : normal S1 and S2 [Heart Sounds Gallop] : no gallops [Murmurs] : no murmurs [Heart Sounds Pericardial Friction Rub] : no pericardial rub [No CVA Tenderness] : no ~M costovertebral angle tenderness [No Spinal Tenderness] : no spinal tenderness [Nail Clubbing] : no clubbing  or cyanosis of the fingernails [Motor Tone] : muscle strength and tone were normal [] : no rash [2+] : left 2+ [Oriented To Time, Place, And Person] : oriented to person, place, and time [Impaired Insight] : insight and judgment were intact [Affect] : the affect was normal [Respiration, Rhythm And Depth] : normal respiratory rhythm and effort [Abnormal Walk] : normal gait [Musculoskeletal - Swelling] : no joint swelling seen [FreeTextEntry1] : R biceps tendon/ anterior shoulder NT now and fROM- all other joints fROM- no synovitis; hands w/ OA changes DIP/ CMC joint - no synovitis

## 2023-06-13 NOTE — ASSESSMENT
[FreeTextEntry1] :  68 year old female w/ hx osteopenia-> Osteoporosis and intermittent regional pain- most severe R shoulder.. . \par REcent COVID infection (unvaccinated)  Jovon with high fevers x 3 days w/ double ear infections, lymphadenopathy, sore throat, rash.. essentially w/ full resolution though fatigue persists- no sequelae\par \par 1) R shoulder pain (overuse on R side):  for many ys worse at end of day after use.. , limited ROM.. noted intermittently for years.. dx bursitis/ tendinopathy .. given steroid injection w/ excellent response several ys ago, not interested in repeat now and doing well \par Will continue w/ topical CBD/ or try diclofenac. \par Also discussed change in ergonomics with work.. may benefit from OT for now start PT. \par \par 2) Osteopenia-> Osteoporosis:  first dx 2020. Updated Dexa 5/8/23 w/ most severe T score -2.5 at [facility] overall completely stable from 2022 study.  Continues on routine supplements, Vit D level 43- stable and taking Algeacal/ Strontum boost... \par Still with overall  low risk fracture 12 % overall and 2.9% risk hip fracture. Does not want to add any medications, fear of SE and doesn't believe it will help\par Routine wt bearing activity 3-5 x wk and exercises on eliptical/ walking..pilates/ yoga \par Lengthy discussion R/B/SE treatment vs. no treatment\par Continues to focus on healthy eating working w/ nutritionist\par Labs 5/23 stable Ca/a/PTH/ Vit D 43\par NO fractures...  on natural supplements \par Ex-Smoker > 30 yrs ago, drinks 2 glasses of alcohol a night \par Stable wt for ys BMI 20-22  \par \par 3) Chronic back pain w/ Pain R leg/ mild Scoliosis:  buttocks and into R calf.. worse w/ standing and walking nagging- eventually stabbing.. not associated w/ n/t/ weakness.. better w/ stretching, releasing pressure- and leaning forward, c/w neurogenic claudication- imaging MRI LS + SS throughout L4-5 region most severely. \par Not active issue at this time, doesn't limit activities at all \par - continue massage, tens, stretching and core strengthening\par - continue topical analgesics PRN\par - in future may consider low dose gabapentin (but wants to avoid pharm) or KARIE... nothing necessary at this time \par NOTE: \par MRI 3/21.. suggests L4-5 central and foraminal stenosis\par Seen by chiropractor prior to onset of increased discomfort in RLE.. progressively worse since.. \par \par 4) Raynauds: mild x many ys but otherwise little in H&P to suggest systemic process at this time. Labs not necessary, but reviewed S/S CTD and advised to call if any new arise, would reeval .  \par ROS: only + Ys ago low wbc ys ago, no recurrence \par \par \par Plan: \par - continue conservative tx with natural OTC analgesics\par \par - discussed ergonomics at work.. may benefit from OT if continues if shoulder or lower back persists, not necessary at this time \par \par - continue wt bearing exercises and core strengthening with nutritional supplements.  \par \par - will hold off on OP treatment per patients request, advised to call immediately if any fractures.. Will repeat study in 2 ys.. \par \par - call if anything new arises..

## 2023-12-24 ENCOUNTER — TRANSCRIPTION ENCOUNTER (OUTPATIENT)
Age: 68
End: 2023-12-24

## 2024-01-08 ENCOUNTER — APPOINTMENT (OUTPATIENT)
Dept: FAMILY MEDICINE | Facility: CLINIC | Age: 69
End: 2024-01-08
Payer: MEDICARE

## 2024-01-08 VITALS
DIASTOLIC BLOOD PRESSURE: 70 MMHG | HEART RATE: 57 BPM | HEIGHT: 61 IN | OXYGEN SATURATION: 99 % | WEIGHT: 112 LBS | SYSTOLIC BLOOD PRESSURE: 112 MMHG | BODY MASS INDEX: 21.14 KG/M2

## 2024-01-08 DIAGNOSIS — R21 RASH AND OTHER NONSPECIFIC SKIN ERUPTION: ICD-10-CM

## 2024-01-08 DIAGNOSIS — R10.32 LEFT LOWER QUADRANT PAIN: ICD-10-CM

## 2024-01-08 PROCEDURE — 99214 OFFICE O/P EST MOD 30 MIN: CPT

## 2024-01-08 NOTE — PLAN
[FreeTextEntry1] : 68F HFU  reviewed labs/imaging and consult note   post herpetic neuropathy:  advised gabapentin 100 mg PO at bedtime Tylenol and or motrin for relief   LLQ pain:  resolved, diet as tolerated  f/u with GI if pain reoccurs   VSS in office  continue OTC supplements as directed   RTO as routine for follow up.

## 2024-01-08 NOTE — HISTORY OF PRESENT ILLNESS
[Post-hospitalization from ___ Hospital] : Post-hospitalization from [unfilled] Hospital [Admitted on: ___] : The patient was admitted on [unfilled] [Discharged on ___] : discharged on [unfilled] [FreeTextEntry2] : 68F is here for hospital follow up. She was seen 2x in ER due to LLQ pain and rash on left flank. Normal labs, CT abd/pelvis, CT enterography, EGD and colonoscopy. She was shingles on left flank. States she is still in pain worse at night. Denies fever, chills, melena.

## 2024-01-08 NOTE — PHYSICAL EXAM
[No Acute Distress] : no acute distress [Normal Sclera/Conjunctiva] : normal sclera/conjunctiva [Normal Outer Ear/Nose] : the outer ears and nose were normal in appearance [No JVD] : no jugular venous distention [No Lymphadenopathy] : no lymphadenopathy [Supple] : supple [No Respiratory Distress] : no respiratory distress  [No Accessory Muscle Use] : no accessory muscle use [Clear to Auscultation] : lungs were clear to auscultation bilaterally [Normal Rate] : normal rate  [Regular Rhythm] : with a regular rhythm [Normal S1, S2] : normal S1 and S2 [No Murmur] : no murmur heard [No Extremity Clubbing/Cyanosis] : no extremity clubbing/cyanosis [Soft] : abdomen soft [Non Tender] : non-tender [Non-distended] : non-distended [No HSM] : no HSM [Normal Bowel Sounds] : normal bowel sounds [Normal Gait] : normal gait [Normal Affect] : the affect was normal [de-identified] : left lower flank herpetic lesion

## 2024-02-21 ENCOUNTER — APPOINTMENT (OUTPATIENT)
Dept: OBGYN | Facility: CLINIC | Age: 69
End: 2024-02-21
Payer: MEDICARE

## 2024-02-21 VITALS
BODY MASS INDEX: 22.09 KG/M2 | HEIGHT: 61 IN | WEIGHT: 117 LBS | DIASTOLIC BLOOD PRESSURE: 70 MMHG | SYSTOLIC BLOOD PRESSURE: 120 MMHG

## 2024-02-21 DIAGNOSIS — R92.333 MAMMOGRAPHIC HETEROGENEOUS DENSITY, BILATERAL BREASTS: ICD-10-CM

## 2024-02-21 DIAGNOSIS — Z12.4 ENCOUNTER FOR SCREENING FOR MALIGNANT NEOPLASM OF CERVIX: ICD-10-CM

## 2024-02-21 DIAGNOSIS — Z01.419 ENCOUNTER FOR GYNECOLOGICAL EXAMINATION (GENERAL) (ROUTINE) W/OUT ABNORMAL FINDINGS: ICD-10-CM

## 2024-02-21 DIAGNOSIS — Z12.39 ENCOUNTER FOR OTHER SCREENING FOR MALIGNANT NEOPLASM OF BREAST: ICD-10-CM

## 2024-02-21 DIAGNOSIS — O02.1 MISSED ABORTION: ICD-10-CM

## 2024-02-21 PROCEDURE — 99387 INIT PM E/M NEW PAT 65+ YRS: CPT

## 2024-02-21 RX ORDER — LEVOTHYROXINE SODIUM 137 UG/1
TABLET ORAL
Refills: 0 | Status: ACTIVE | COMMUNITY

## 2024-02-23 LAB
CYTOLOGY CVX/VAG DOC THIN PREP: ABNORMAL
HPV HIGH+LOW RISK DNA PNL CVX: NOT DETECTED

## 2024-03-18 ENCOUNTER — APPOINTMENT (OUTPATIENT)
Dept: OBGYN | Facility: CLINIC | Age: 69
End: 2024-03-18

## 2024-06-08 NOTE — ASSESSMENT
[FreeTextEntry1] : Check labs drawn in office today for above assessed conditions. Labs to be resulted at the [facility] core lab.\par Check xray and US\par vitals and exam stable \par Pt advised to follow up with ortho \par Motrin/tylenol prn pain.  Intact

## 2024-06-19 ENCOUNTER — APPOINTMENT (OUTPATIENT)
Dept: RHEUMATOLOGY | Facility: CLINIC | Age: 69
End: 2024-06-19
Payer: MEDICARE

## 2024-06-19 VITALS
TEMPERATURE: 97.7 F | SYSTOLIC BLOOD PRESSURE: 130 MMHG | HEIGHT: 61 IN | WEIGHT: 116 LBS | DIASTOLIC BLOOD PRESSURE: 86 MMHG | OXYGEN SATURATION: 99 % | HEART RATE: 76 BPM | BODY MASS INDEX: 21.9 KG/M2

## 2024-06-19 DIAGNOSIS — B02.23 POSTHERPETIC POLYNEUROPATHY: ICD-10-CM

## 2024-06-19 DIAGNOSIS — M54.16 RADICULOPATHY, LUMBAR REGION: ICD-10-CM

## 2024-06-19 DIAGNOSIS — R79.89 OTHER SPECIFIED ABNORMAL FINDINGS OF BLOOD CHEMISTRY: ICD-10-CM

## 2024-06-19 DIAGNOSIS — M81.0 AGE-RELATED OSTEOPOROSIS W/OUT CURRENT PATHOLOGICAL FRACTURE: ICD-10-CM

## 2024-06-19 PROCEDURE — G2211 COMPLEX E/M VISIT ADD ON: CPT

## 2024-06-19 PROCEDURE — 99214 OFFICE O/P EST MOD 30 MIN: CPT

## 2024-06-20 PROBLEM — B02.23 SHINGLES (HERPES ZOSTER) POLYNEUROPATHY: Status: ACTIVE | Noted: 2024-01-08

## 2024-06-20 PROBLEM — R79.89 ABNORMAL THYROID BLOOD TEST: Status: ACTIVE | Noted: 2024-06-20

## 2024-06-23 NOTE — HISTORY OF PRESENT ILLNESS
[FreeTextEntry1] : 24 -Overall patient is feeling well still exercising regularly -Patient has hx of osteoporosis not due for follow-up until  however wanted to f/u to discuss recent health issues -Shingles last year (due to extensive stressors) caused issues with BP and thyroid . Rash Left side -R sided back pain that radiated down the leg. Hx scoliosis and stenosis, herniated discs LS multiple, stands for prolonged period of time -Was on levothyroxine/iodine now discontinued. Neg ROXANNA, thyroid hormone now normalized was up to 100 -Labs completed at [facility], Dr. Win, [facility] [place]. Would like Dr. Joanna Bolanos to review  1) Osteoporosis 2) Regional pain:  R shoulder RTC tendinopathy  3) LBP chronic 4) RP mild  _________________________________________________________________________    (Initial HPI 22)  This is a 66 year old female presenting for consultation for shoulder, back and leg pain.   COVID Jovon with high fevers x 3 days w/ double ear infections, lymphadenopathy, sore throat, rash.. essentially   R shoulder pain (overuse on R side):  worse at end of day after use.. , limited ROM.. noted intermittently for years.. dx bursitis.. given steroid injection w/ excellent response.. for few years.. Not sleeping 2/2 shoulder pain..   Osteopenia: . NO fractures...  on natural supplements and c/w daily wt bearing exercise routine walking and pilates, yoga,  with excellent muscle tone Ex-Smoker > 30 yrs ago, drinks 2 glasses of alcohol a night  Stable wt for ys BMI 20-22    Pain R leg:  buttocks and into R calf.. worse w/ standing and walking nagging- eventually stabbing.. not associated w/ n/t/ weakness.. better w/ stretching, releasing pressure, massage, tens, rest.  Progressively worse over past 6 m.  CBD cream w/ + response.. No limited ROM to lower extr.  Known Scoliosis..  MRI 3/21.. suggests L4-5 central and foraminal stenosis Seen by chiropractor just prior to this..   Last night:  cp mid sternal and radiating to L neck/ and shoulder intermittently since covid..  labs, ecg and xray  ROS:  - Long time hx of Raynaud's x 40 yrs worsened with cold weather but w/out complications  - Denies any hair loss, rash, easy bruising, dry eyes, inflammatory eye dx, dry mouth, ulcer/sores in the mouth, cough, or GI distress.  - post-menopausal since    - exercise: gym, weights and yoga, eliptical.   1 miscarriage 1st trimester..  Ys ago low wbc ys ago, no recurrence  Ovarian cyst - ruptured..

## 2024-06-23 NOTE — REVIEW OF SYSTEMS
[Chest Pain] : chest pain [As Noted in HPI] : as noted in HPI [Negative] : Heme/Lymph [FreeTextEntry9] :  Still c/o R-sided glute and calf intermittent pain -- and radicular sx- neurogenic claudication. known hx multilevel disc herniation LS [de-identified] : absolutely denies weakness - still [de-identified] : Shingles episode precipitated by family stress last year [de-identified] : Episode abn TSH x 1 onth tx levothyroxine, precipitated by shingles episode and iodine supplement. D/c supplement and levothyroxine now nl TSH

## 2024-06-23 NOTE — PHYSICAL EXAM
[General Appearance - Alert] : alert [General Appearance - In No Acute Distress] : in no acute distress [General Appearance - Well Nourished] : well nourished [General Appearance - Well Developed] : well developed [General Appearance - Well-Appearing] : healthy appearing [Respiration, Rhythm And Depth] : normal respiratory rhythm and effort [Auscultation Breath Sounds / Voice Sounds] : lungs were clear to auscultation bilaterally [Heart Rate And Rhythm] : heart rate was normal and rhythm regular [Heart Sounds] : normal S1 and S2 [Heart Sounds Gallop] : no gallops [Murmurs] : no murmurs [Heart Sounds Pericardial Friction Rub] : no pericardial rub [No CVA Tenderness] : no ~M costovertebral angle tenderness [No Spinal Tenderness] : no spinal tenderness [] : no rash [Oriented To Time, Place, And Person] : oriented to person, place, and time [Impaired Insight] : insight and judgment were intact [Affect] : the affect was normal [Abnormal Walk] : normal gait [Nail Clubbing] : no clubbing  or cyanosis of the fingernails [Musculoskeletal - Swelling] : no joint swelling seen [Motor Tone] : muscle strength and tone were normal [Sclera] : the sclera and conjunctiva were normal [Outer Ear] : the ears and nose were normal in appearance [Exaggerated Use Of Accessory Muscles For Inspiration] : no accessory muscle use [Edema] : there was no peripheral edema [FreeTextEntry1] : No active synovitis. Previous exam: R biceps tendon/ anterior shoulder NT now and fROM- all other joints fROM; hands w/ OA changes DIP/ CMC joint

## 2024-06-23 NOTE — ASSESSMENT
[FreeTextEntry1] :  68 year old female w/ hx osteopenia-> Osteoporosis and intermittent regional pain- most severe R shoulder presents for follow-up to provide updated on recent health issues.  Recent COVID infection (unvaccinated) Jan 2023   1) Osteopenia-> Osteoporosis:  first dx 2020. Updated Dexa 5/8/23 w/ most severe T score -2.5 at [facility] overall completely stable from 2022 study.  Continues on routine supplements, Vit D level 43- stable and taking Algeacal/ Strontum boost...  Still with overall low risk fracture 12 % overall and 2.9% risk hip fracture. Does not want to add any medications, fear of SE and doesn't believe it will help Routine wt bearing activity 3-5 x wk and exercises on eliptical/ walking..pilates/ yoga  Lengthy discussion R/B/SE treatment vs. no treatment Continues to focus on healthy eating working w/ nutritionist Labs 5/23 stable Ca/a/PTH/ Vit D 43 NO fractures...  on natural supplements  Ex-Smoker > 30 yrs ago, drinks 2 glasses of alcohol a night  Stable wt for ys BMI 20-22 Discussed updating OP labs today, not due for updated Dexa until 05/08/2025 Pt would like provider to review all recent labs before ordering updated OP labs  2) Chronic back pain w/ Pain R leg/ mild Scoliosis:  buttocks and into R calf.. worse w/ standing and walking nagging- eventually stabbing.. not associated w/ n/t weakness.. better w/ stretching, releasing pressure- and leaning forward, c/w neurogenic claudication- imaging MRI LS + SS throughout L4-5 region most severely.  Reports as active issue today but not limited daily activity and pain is manageable. Still denies weakness, numbness, tingling, CE sxs. Pain now non-continuous pattern R glute and R calf intermittent still exacerbated by prolonged standing. Continues regularly exercise but avoids aggravating activities. -Ordered updated MR of LS - patient to call for results. Additional recommendation pending results -Will review during f/u call previous recommendations: 1) continue massage, tens, stretching and core strengthening 2) continue topical analgesics PRN 3) in future may consider low dose gabapentin (but wants to avoid pharm) or KARIE... nothing necessary at this time  NOTE:  MRI 3/21.. suggests L4-5 central and foraminal stenosis Seen by chiropractor prior to onset of increased discomfort in RLE.. progressively worse since..   3) R shoulder pain (overuse on R side):  for many ys worse at end of day after use.. , limited ROM.. noted intermittently for years.. dx bursitis/ tendinopathy .. given steroid injection w/ excellent response several ys ago, not interested in repeat now and doing well  Will continue w/ topical CBD/ or try diclofenac.  Also discussed change in ergonomics with work.. may benefit from OT for now start PT. Not active issue today; no additional recommendations at this time  4) Raynauds: Minimal if any activity. Neg review aiCTD sxs. No additional w/u at this time Mild x many ys but otherwise little in H&P to suggest systemic process at this time. Labs not necessary, but reviewed S/S CTD and advised to call if any new arise, would reeval .   ROS: only + Ys ago low wbc ys ago, no recurrence  5) Shingles: episode shingles 12/23-01/24 small rash L lower back, pain severe requiring emergency attention. Tx valtrex, now completely resolved. Will review with patient recommendation for shingles vaccine.  6) Thyroig/BP: elevated   - and elevated BP during episode shingles. Tx hypothyroidism synthroid x 1 month and d/c iodine supplement. TSH/BP has since normalized patient will continue to follow with PCP (has been normal since 2020).    PLAN:  -MR ordered LS pt to call for results. Additional recommendations following imaging review  -Tasked [facility] team request records from other providers for review. Patient reminded to since release of records at   -DEXA due 05/08/2025 Will hold off on OP treatment per patients request, advised to call immediately if any fractures. Will review recent labs and order updated OP labs as appropriate.  -Continue wt bearing exercises and core strengthening  - continue conservative tx with natural OTC analgesics PRN  -Patient will present to ER for sxs CE or intractable pain  Patient was seen and evaluated by Jia Mejias DNP (training in rheumatology) and with SLALY Bolanos I agree with full evaluation and plan as described above.

## 2024-10-14 ENCOUNTER — APPOINTMENT (OUTPATIENT)
Dept: NEUROSURGERY | Facility: CLINIC | Age: 69
End: 2024-10-14
Payer: MEDICARE

## 2024-10-14 VITALS — BODY MASS INDEX: 21.34 KG/M2 | HEIGHT: 61 IN | WEIGHT: 113 LBS | HEART RATE: 76 BPM | OXYGEN SATURATION: 98 %

## 2024-10-14 DIAGNOSIS — M48.062 SPINAL STENOSIS, LUMBAR REGION WITH NEUROGENIC CLAUDICATION: ICD-10-CM

## 2024-10-14 DIAGNOSIS — M54.16 RADICULOPATHY, LUMBAR REGION: ICD-10-CM

## 2024-10-14 DIAGNOSIS — M43.16 SPONDYLOLISTHESIS, LUMBAR REGION: ICD-10-CM

## 2024-10-14 DIAGNOSIS — M81.0 AGE-RELATED OSTEOPOROSIS W/OUT CURRENT PATHOLOGICAL FRACTURE: ICD-10-CM

## 2024-10-14 PROCEDURE — 99203 OFFICE O/P NEW LOW 30 MIN: CPT

## 2024-10-15 ENCOUNTER — NON-APPOINTMENT (OUTPATIENT)
Age: 69
End: 2024-10-15

## 2024-10-20 PROBLEM — M43.16 SPONDYLOLISTHESIS OF LUMBAR REGION: Status: ACTIVE | Noted: 2024-10-20

## 2024-10-30 ENCOUNTER — APPOINTMENT (OUTPATIENT)
Dept: RHEUMATOLOGY | Facility: CLINIC | Age: 69
End: 2024-10-30
Payer: MEDICARE

## 2024-10-30 VITALS
WEIGHT: 117 LBS | OXYGEN SATURATION: 97 % | HEART RATE: 70 BPM | DIASTOLIC BLOOD PRESSURE: 66 MMHG | TEMPERATURE: 96.8 F | BODY MASS INDEX: 22.09 KG/M2 | HEIGHT: 61 IN | SYSTOLIC BLOOD PRESSURE: 122 MMHG

## 2024-10-30 DIAGNOSIS — M81.0 AGE-RELATED OSTEOPOROSIS W/OUT CURRENT PATHOLOGICAL FRACTURE: ICD-10-CM

## 2024-10-30 DIAGNOSIS — M48.062 SPINAL STENOSIS, LUMBAR REGION WITH NEUROGENIC CLAUDICATION: ICD-10-CM

## 2024-10-30 PROCEDURE — G2211 COMPLEX E/M VISIT ADD ON: CPT

## 2024-10-30 PROCEDURE — 99213 OFFICE O/P EST LOW 20 MIN: CPT

## 2024-10-30 RX ORDER — PNV NO.95/FERROUS FUM/FOLIC AC 28MG-0.8MG
TABLET ORAL
Refills: 0 | Status: ACTIVE | COMMUNITY

## 2024-10-30 RX ORDER — MULTIVIT-MIN/FOLIC/VIT K/LYCOP 400-300MCG
250 TABLET ORAL
Refills: 0 | Status: ACTIVE | COMMUNITY

## 2024-10-30 RX ORDER — ROMOSOZUMAB-AQQG 105 MG/1.17ML
105 INJECTION, SOLUTION SUBCUTANEOUS
Qty: 2.34 | Refills: 11 | Status: ACTIVE | COMMUNITY
Start: 2024-10-30 | End: 1900-01-01

## 2024-10-30 RX ORDER — CALCIUM/D3/MAG/K2/MIN/HERB 326 333-32 MG
TABLET ORAL
Refills: 0 | Status: ACTIVE | COMMUNITY

## 2024-10-30 RX ORDER — BACITRACIN 500 UNIT/G
OINTMENT (GRAM) TOPICAL
Refills: 0 | Status: ACTIVE | COMMUNITY

## 2024-12-31 ENCOUNTER — APPOINTMENT (OUTPATIENT)
Dept: RHEUMATOLOGY | Facility: CLINIC | Age: 69
End: 2024-12-31

## 2025-02-19 ENCOUNTER — APPOINTMENT (OUTPATIENT)
Dept: RHEUMATOLOGY | Facility: CLINIC | Age: 70
End: 2025-02-19
Payer: MEDICARE

## 2025-02-19 PROCEDURE — 99213 OFFICE O/P EST LOW 20 MIN: CPT | Mod: 93

## 2025-03-11 ENCOUNTER — APPOINTMENT (OUTPATIENT)
Facility: CLINIC | Age: 70
End: 2025-03-11

## 2025-03-11 VITALS
SYSTOLIC BLOOD PRESSURE: 160 MMHG | HEART RATE: 78 BPM | OXYGEN SATURATION: 97 % | WEIGHT: 116 LBS | BODY MASS INDEX: 21.9 KG/M2 | HEIGHT: 61 IN | DIASTOLIC BLOOD PRESSURE: 88 MMHG

## 2025-03-11 DIAGNOSIS — R07.9 CHEST PAIN, UNSPECIFIED: ICD-10-CM

## 2025-03-11 PROCEDURE — 93000 ELECTROCARDIOGRAM COMPLETE: CPT

## 2025-03-11 PROCEDURE — 99204 OFFICE O/P NEW MOD 45 MIN: CPT | Mod: 25

## 2025-06-03 ENCOUNTER — NON-APPOINTMENT (OUTPATIENT)
Age: 70
End: 2025-06-03

## 2025-06-03 PROBLEM — Z86.69 HISTORY OF SCIATICA: Status: RESOLVED | Noted: 2025-06-03 | Resolved: 2025-06-03

## 2025-06-03 PROBLEM — R01.1 HEART MURMUR: Status: ACTIVE | Noted: 2025-06-03

## 2025-06-03 PROBLEM — Z87.39 HISTORY OF TENDINITIS: Status: RESOLVED | Noted: 2025-06-03 | Resolved: 2025-06-03

## 2025-06-03 PROBLEM — Z87.39 HISTORY OF BURSITIS: Status: RESOLVED | Noted: 2025-06-03 | Resolved: 2025-06-03

## 2025-06-03 PROBLEM — R03.0 BORDERLINE HYPERTENSION: Status: ACTIVE | Noted: 2025-06-03

## 2025-06-03 PROBLEM — Z87.891 FORMER SMOKER: Status: RESOLVED | Noted: 2025-06-03 | Resolved: 2025-06-03

## 2025-06-03 PROBLEM — J30.2 SEASONAL ALLERGIES: Status: RESOLVED | Noted: 2025-06-03 | Resolved: 2025-06-03

## 2025-06-03 PROBLEM — Z78.0 POST-MENOPAUSAL: Status: ACTIVE | Noted: 2025-06-03

## 2025-06-03 PROBLEM — R09.89 BRUIT OF LEFT CAROTID ARTERY: Status: ACTIVE | Noted: 2025-06-03

## 2025-06-03 PROBLEM — Z87.39 HISTORY OF SCOLIOSIS: Status: RESOLVED | Noted: 2025-06-03 | Resolved: 2025-06-03

## 2025-06-05 ENCOUNTER — NON-APPOINTMENT (OUTPATIENT)
Age: 70
End: 2025-06-05

## 2025-06-05 ENCOUNTER — APPOINTMENT (OUTPATIENT)
Dept: CARDIOLOGY | Facility: CLINIC | Age: 70
End: 2025-06-05
Payer: MEDICARE

## 2025-06-05 VITALS
HEIGHT: 61 IN | BODY MASS INDEX: 21.9 KG/M2 | OXYGEN SATURATION: 99 % | SYSTOLIC BLOOD PRESSURE: 160 MMHG | HEART RATE: 70 BPM | WEIGHT: 116 LBS | DIASTOLIC BLOOD PRESSURE: 72 MMHG

## 2025-06-05 DIAGNOSIS — I25.10 ATHEROSCLEROTIC HEART DISEASE OF NATIVE CORONARY ARTERY W/OUT ANGINA PECTORIS: ICD-10-CM

## 2025-06-05 DIAGNOSIS — Z87.39 PERSONAL HISTORY OF OTHER DISEASES OF THE MUSCULOSKELETAL SYSTEM AND CONNECTIVE TISSUE: ICD-10-CM

## 2025-06-05 DIAGNOSIS — J30.2 OTHER SEASONAL ALLERGIC RHINITIS: ICD-10-CM

## 2025-06-05 DIAGNOSIS — R93.1 ABNORMAL FINDINGS ON DIAGNOSTIC IMAGING OF HEART AND CORONARY CIRCULATION: ICD-10-CM

## 2025-06-05 DIAGNOSIS — Z86.69 PERSONAL HISTORY OF OTHER DISEASES OF THE NERVOUS SYSTEM AND SENSE ORGANS: ICD-10-CM

## 2025-06-05 DIAGNOSIS — Z78.0 ASYMPTOMATIC MENOPAUSAL STATE: ICD-10-CM

## 2025-06-05 DIAGNOSIS — R01.1 CARDIAC MURMUR, UNSPECIFIED: ICD-10-CM

## 2025-06-05 DIAGNOSIS — R06.02 SHORTNESS OF BREATH: ICD-10-CM

## 2025-06-05 DIAGNOSIS — R03.0 ELEVATED BLOOD-PRESSURE READING, W/OUT DIAGNOSIS OF HYPERTENSION: ICD-10-CM

## 2025-06-05 DIAGNOSIS — Z87.891 PERSONAL HISTORY OF NICOTINE DEPENDENCE: ICD-10-CM

## 2025-06-05 DIAGNOSIS — R09.89 OTHER SPECIFIED SYMPTOMS AND SIGNS INVOLVING THE CIRCULATORY AND RESPIRATORY SYSTEMS: ICD-10-CM

## 2025-06-05 DIAGNOSIS — Z86.79 PERSONAL HISTORY OF OTHER DISEASES OF THE CIRCULATORY SYSTEM: ICD-10-CM

## 2025-06-05 PROCEDURE — 93000 ELECTROCARDIOGRAM COMPLETE: CPT

## 2025-06-05 PROCEDURE — 99214 OFFICE O/P EST MOD 30 MIN: CPT | Mod: 25

## 2025-06-05 RX ORDER — ROSUVASTATIN CALCIUM 20 MG/1
20 TABLET, FILM COATED ORAL DAILY
Qty: 90 | Refills: 3 | Status: ACTIVE | COMMUNITY
Start: 2025-06-05 | End: 1900-01-01

## 2025-07-07 ENCOUNTER — APPOINTMENT (OUTPATIENT)
Dept: CARDIOLOGY | Facility: CLINIC | Age: 70
End: 2025-07-07
Payer: MEDICARE

## 2025-07-07 PROCEDURE — 93306 TTE W/DOPPLER COMPLETE: CPT

## 2025-07-22 ENCOUNTER — APPOINTMENT (OUTPATIENT)
Facility: CLINIC | Age: 70
End: 2025-07-22
Payer: MEDICARE

## 2025-07-22 VITALS
BODY MASS INDEX: 21.71 KG/M2 | HEART RATE: 74 BPM | SYSTOLIC BLOOD PRESSURE: 110 MMHG | WEIGHT: 115 LBS | OXYGEN SATURATION: 98 % | HEIGHT: 61 IN | DIASTOLIC BLOOD PRESSURE: 62 MMHG

## 2025-07-22 DIAGNOSIS — E78.5 HYPERLIPIDEMIA, UNSPECIFIED: ICD-10-CM

## 2025-07-22 PROCEDURE — 99214 OFFICE O/P EST MOD 30 MIN: CPT

## 2025-07-22 RX ORDER — LACTOBACILLUS ACIDOPHILUS/PECT 30 MG-20MG
TABLET ORAL
Refills: 0 | Status: ACTIVE | COMMUNITY

## 2025-07-22 RX ORDER — UBIDECARENONE 200 MG
200 CAPSULE ORAL
Refills: 0 | Status: ACTIVE | COMMUNITY

## 2025-09-16 ENCOUNTER — APPOINTMENT (OUTPATIENT)
Facility: CLINIC | Age: 70
End: 2025-09-16
Payer: MEDICARE

## 2025-09-16 VITALS
DIASTOLIC BLOOD PRESSURE: 70 MMHG | BODY MASS INDEX: 21.71 KG/M2 | HEART RATE: 71 BPM | SYSTOLIC BLOOD PRESSURE: 120 MMHG | HEIGHT: 61 IN | WEIGHT: 115 LBS | OXYGEN SATURATION: 98 %

## 2025-09-16 PROCEDURE — 99214 OFFICE O/P EST MOD 30 MIN: CPT
